# Patient Record
Sex: MALE | Race: WHITE | Employment: OTHER | URBAN - NONMETROPOLITAN AREA
[De-identification: names, ages, dates, MRNs, and addresses within clinical notes are randomized per-mention and may not be internally consistent; named-entity substitution may affect disease eponyms.]

---

## 2024-09-22 ENCOUNTER — APPOINTMENT (OUTPATIENT)
Dept: GENERAL RADIOLOGY | Age: 84
End: 2024-09-22
Payer: MEDICARE

## 2024-09-22 ENCOUNTER — HOSPITAL ENCOUNTER (INPATIENT)
Age: 84
LOS: 3 days | Discharge: NURSING, REHAB OR LONG-TERM CARE | End: 2024-09-25
Attending: EMERGENCY MEDICINE | Admitting: INTERNAL MEDICINE
Payer: MEDICARE

## 2024-09-22 DIAGNOSIS — S72.002A CLOSED FRACTURE OF LEFT HIP, INITIAL ENCOUNTER (HCC): Primary | ICD-10-CM

## 2024-09-22 PROBLEM — M25.552 LEFT HIP PAIN: Status: ACTIVE | Noted: 2024-09-22

## 2024-09-22 LAB
-: ABNORMAL
ALBUMIN SERPL-MCNC: 4 G/DL (ref 3.5–5.2)
ALP SERPL-CCNC: 50 U/L (ref 40–129)
ALT SERPL-CCNC: 8 U/L (ref 5–41)
ANION GAP SERPL CALCULATED.3IONS-SCNC: 10 MMOL/L (ref 9–17)
AST SERPL-CCNC: 14 U/L
BACTERIA URNS QL MICRO: ABNORMAL
BASOPHILS # BLD: 0.03 K/UL (ref 0–0.2)
BASOPHILS NFR BLD: 1 % (ref 0–2)
BILIRUB SERPL-MCNC: 0.3 MG/DL (ref 0.3–1.2)
BILIRUB UR QL STRIP: NEGATIVE
BUN SERPL-MCNC: 22 MG/DL (ref 8–23)
BUN/CREAT SERPL: 20 (ref 9–20)
CALCIUM SERPL-MCNC: 9.6 MG/DL (ref 8.6–10.4)
CHLORIDE SERPL-SCNC: 104 MMOL/L (ref 98–107)
CLARITY UR: ABNORMAL
CO2 SERPL-SCNC: 28 MMOL/L (ref 20–31)
COLOR UR: YELLOW
COMMENT: ABNORMAL
CREAT SERPL-MCNC: 1.1 MG/DL (ref 0.7–1.2)
EOSINOPHIL # BLD: 0.26 K/UL (ref 0–0.4)
EOSINOPHILS RELATIVE PERCENT: 4 % (ref 0–5)
EPI CELLS #/AREA URNS HPF: ABNORMAL /HPF
ERYTHROCYTE [DISTWIDTH] IN BLOOD BY AUTOMATED COUNT: 13.2 % (ref 12.1–15.2)
EST. AVERAGE GLUCOSE BLD GHB EST-MCNC: 126 MG/DL
GFR, ESTIMATED: 67 ML/MIN/1.73M2
GLUCOSE BLD-MCNC: 144 MG/DL (ref 65–99)
GLUCOSE BLD-MCNC: 152 MG/DL (ref 65–99)
GLUCOSE SERPL-MCNC: 150 MG/DL (ref 70–99)
GLUCOSE UR STRIP-MCNC: NEGATIVE MG/DL
HBA1C MFR BLD: 6 % (ref 4–6)
HCT VFR BLD AUTO: 36.1 % (ref 41–53)
HGB BLD-MCNC: 12 G/DL (ref 13.5–17.5)
HGB UR QL STRIP.AUTO: ABNORMAL
IMM GRANULOCYTES # BLD AUTO: 0.03 K/UL (ref 0–0.3)
IMM GRANULOCYTES NFR BLD: 1 % (ref 0–5)
INR PPP: 1
KETONES UR STRIP-MCNC: NEGATIVE MG/DL
LEUKOCYTE ESTERASE UR QL STRIP: ABNORMAL
LYMPHOCYTES NFR BLD: 0.99 K/UL (ref 1–4.8)
LYMPHOCYTES RELATIVE PERCENT: 15 % (ref 13–44)
MCH RBC QN AUTO: 30 PG (ref 26–34)
MCHC RBC AUTO-ENTMCNC: 33.2 G/DL (ref 31–37)
MCV RBC AUTO: 90.3 FL (ref 80–100)
MONOCYTES NFR BLD: 0.5 K/UL (ref 0–1)
MONOCYTES NFR BLD: 8 % (ref 5–9)
NEUTROPHILS NFR BLD: 71 % (ref 39–75)
NEUTS SEG NFR BLD: 4.61 K/UL (ref 2.1–6.5)
NITRITE UR QL STRIP: POSITIVE
PARTIAL THROMBOPLASTIN TIME: 28.7 SEC (ref 23.9–33.8)
PH UR STRIP: 8 [PH] (ref 5–8)
PLATELET # BLD AUTO: 232 K/UL (ref 140–450)
PMV BLD AUTO: 10.9 FL (ref 6–12)
POTASSIUM SERPL-SCNC: 4.5 MMOL/L (ref 3.7–5.3)
PROT SERPL-MCNC: 6.9 G/DL (ref 6.4–8.3)
PROT UR STRIP-MCNC: ABNORMAL MG/DL
PROTHROMBIN TIME: 13.5 SEC (ref 11.5–14.2)
RBC # BLD AUTO: 4 M/UL (ref 4.5–5.9)
RBC #/AREA URNS HPF: ABNORMAL /HPF (ref 0–2)
SODIUM SERPL-SCNC: 142 MMOL/L (ref 135–144)
SP GR UR STRIP: 1.01 (ref 1–1.03)
UROBILINOGEN UR STRIP-ACNC: NORMAL EU/DL (ref 0–1)
WBC #/AREA URNS HPF: ABNORMAL /HPF
WBC OTHER # BLD: 6.4 K/UL (ref 3.5–11)

## 2024-09-22 PROCEDURE — 6370000000 HC RX 637 (ALT 250 FOR IP): Performed by: INTERNAL MEDICINE

## 2024-09-22 PROCEDURE — 93005 ELECTROCARDIOGRAM TRACING: CPT | Performed by: EMERGENCY MEDICINE

## 2024-09-22 PROCEDURE — 83036 HEMOGLOBIN GLYCOSYLATED A1C: CPT

## 2024-09-22 PROCEDURE — 73502 X-RAY EXAM HIP UNI 2-3 VIEWS: CPT

## 2024-09-22 PROCEDURE — 51702 INSERT TEMP BLADDER CATH: CPT

## 2024-09-22 PROCEDURE — 85610 PROTHROMBIN TIME: CPT

## 2024-09-22 PROCEDURE — 85730 THROMBOPLASTIN TIME PARTIAL: CPT

## 2024-09-22 PROCEDURE — 1200000000 HC SEMI PRIVATE

## 2024-09-22 PROCEDURE — 85025 COMPLETE CBC W/AUTO DIFF WBC: CPT

## 2024-09-22 PROCEDURE — 99285 EMERGENCY DEPT VISIT HI MDM: CPT

## 2024-09-22 PROCEDURE — 94761 N-INVAS EAR/PLS OXIMETRY MLT: CPT

## 2024-09-22 PROCEDURE — 80053 COMPREHEN METABOLIC PANEL: CPT

## 2024-09-22 PROCEDURE — 87086 URINE CULTURE/COLONY COUNT: CPT

## 2024-09-22 PROCEDURE — 81001 URINALYSIS AUTO W/SCOPE: CPT

## 2024-09-22 PROCEDURE — 82947 ASSAY GLUCOSE BLOOD QUANT: CPT

## 2024-09-22 PROCEDURE — 71045 X-RAY EXAM CHEST 1 VIEW: CPT

## 2024-09-22 RX ORDER — SODIUM CHLORIDE 0.9 % (FLUSH) 0.9 %
5-40 SYRINGE (ML) INJECTION EVERY 12 HOURS SCHEDULED
Status: DISCONTINUED | OUTPATIENT
Start: 2024-09-22 | End: 2024-09-25 | Stop reason: HOSPADM

## 2024-09-22 RX ORDER — ACETAMINOPHEN 650 MG/1
650 SUPPOSITORY RECTAL EVERY 6 HOURS PRN
Status: DISCONTINUED | OUTPATIENT
Start: 2024-09-22 | End: 2024-09-25 | Stop reason: HOSPADM

## 2024-09-22 RX ORDER — SODIUM CHLORIDE 9 MG/ML
INJECTION, SOLUTION INTRAVENOUS PRN
Status: DISCONTINUED | OUTPATIENT
Start: 2024-09-22 | End: 2024-09-25 | Stop reason: HOSPADM

## 2024-09-22 RX ORDER — CYCLOBENZAPRINE HCL 10 MG
5 TABLET ORAL EVERY 6 HOURS PRN
Status: DISCONTINUED | OUTPATIENT
Start: 2024-09-22 | End: 2024-09-25 | Stop reason: HOSPADM

## 2024-09-22 RX ORDER — POTASSIUM CHLORIDE 7.45 MG/ML
10 INJECTION INTRAVENOUS PRN
Status: DISCONTINUED | OUTPATIENT
Start: 2024-09-22 | End: 2024-09-25 | Stop reason: HOSPADM

## 2024-09-22 RX ORDER — FERROUS SULFATE 325(65) MG
325 TABLET ORAL
Status: DISCONTINUED | OUTPATIENT
Start: 2024-09-23 | End: 2024-09-25 | Stop reason: HOSPADM

## 2024-09-22 RX ORDER — LISINOPRIL 2.5 MG/1
2.5 TABLET ORAL DAILY
Status: ON HOLD | COMMUNITY
Start: 2024-09-06

## 2024-09-22 RX ORDER — INSULIN LISPRO 100 [IU]/ML
0-4 INJECTION, SOLUTION INTRAVENOUS; SUBCUTANEOUS
Status: DISCONTINUED | OUTPATIENT
Start: 2024-09-22 | End: 2024-09-25 | Stop reason: HOSPADM

## 2024-09-22 RX ORDER — FERROUS SULFATE 325(65) MG
325 TABLET ORAL
Status: ON HOLD | COMMUNITY
Start: 2023-09-05

## 2024-09-22 RX ORDER — FAMOTIDINE 20 MG/1
20 TABLET, FILM COATED ORAL 2 TIMES DAILY
Status: DISCONTINUED | OUTPATIENT
Start: 2024-09-22 | End: 2024-09-25 | Stop reason: HOSPADM

## 2024-09-22 RX ORDER — LANOLIN ALCOHOL/MO/W.PET/CERES
500 CREAM (GRAM) TOPICAL DAILY
Status: DISCONTINUED | OUTPATIENT
Start: 2024-09-22 | End: 2024-09-25 | Stop reason: HOSPADM

## 2024-09-22 RX ORDER — SODIUM CHLORIDE 450 MG/100ML
INJECTION, SOLUTION INTRAVENOUS CONTINUOUS
Status: DISCONTINUED | OUTPATIENT
Start: 2024-09-23 | End: 2024-09-23

## 2024-09-22 RX ORDER — SODIUM CHLORIDE 0.9 % (FLUSH) 0.9 %
5-40 SYRINGE (ML) INJECTION PRN
Status: DISCONTINUED | OUTPATIENT
Start: 2024-09-22 | End: 2024-09-24

## 2024-09-22 RX ORDER — TRAMADOL HYDROCHLORIDE 50 MG/1
50 TABLET ORAL EVERY 6 HOURS PRN
Status: DISCONTINUED | OUTPATIENT
Start: 2024-09-22 | End: 2024-09-25 | Stop reason: HOSPADM

## 2024-09-22 RX ORDER — DEXTROSE MONOHYDRATE 100 MG/ML
INJECTION, SOLUTION INTRAVENOUS CONTINUOUS PRN
Status: DISCONTINUED | OUTPATIENT
Start: 2024-09-22 | End: 2024-09-25 | Stop reason: HOSPADM

## 2024-09-22 RX ORDER — CEFAZOLIN SODIUM 2 G/50ML
2000 SOLUTION INTRAVENOUS
Status: COMPLETED | OUTPATIENT
Start: 2024-09-23 | End: 2024-09-23

## 2024-09-22 RX ORDER — UREA 10 %
500 LOTION (ML) TOPICAL DAILY
Status: ON HOLD | COMMUNITY

## 2024-09-22 RX ORDER — CELECOXIB 200 MG/1
200 CAPSULE ORAL DAILY
Status: ON HOLD | COMMUNITY
Start: 2019-12-31

## 2024-09-22 RX ORDER — GLUCAGON 1 MG/ML
1 KIT INJECTION PRN
Status: DISCONTINUED | OUTPATIENT
Start: 2024-09-22 | End: 2024-09-25 | Stop reason: HOSPADM

## 2024-09-22 RX ORDER — OXYCODONE AND ACETAMINOPHEN 5; 325 MG/1; MG/1
1 TABLET ORAL EVERY 6 HOURS PRN
Status: DISCONTINUED | OUTPATIENT
Start: 2024-09-22 | End: 2024-09-25 | Stop reason: HOSPADM

## 2024-09-22 RX ORDER — ASPIRIN 81 MG/1
81 TABLET, CHEWABLE ORAL
Status: ON HOLD | COMMUNITY
Start: 2018-05-14

## 2024-09-22 RX ORDER — SITAGLIPTIN AND METFORMIN HYDROCHLORIDE 1000; 50 MG/1; MG/1
1 TABLET, FILM COATED ORAL DAILY
Status: ON HOLD | COMMUNITY
Start: 2019-12-28

## 2024-09-22 RX ORDER — ONDANSETRON 2 MG/ML
4 INJECTION INTRAMUSCULAR; INTRAVENOUS EVERY 6 HOURS PRN
Status: DISCONTINUED | OUTPATIENT
Start: 2024-09-22 | End: 2024-09-25 | Stop reason: HOSPADM

## 2024-09-22 RX ORDER — ALOGLIPTIN 12.5 MG/1
12.5 TABLET, FILM COATED ORAL DAILY
Status: DISCONTINUED | OUTPATIENT
Start: 2024-09-23 | End: 2024-09-25 | Stop reason: HOSPADM

## 2024-09-22 RX ORDER — POLYETHYLENE GLYCOL 3350 17 G/17G
17 POWDER, FOR SOLUTION ORAL DAILY PRN
Status: DISCONTINUED | OUTPATIENT
Start: 2024-09-22 | End: 2024-09-25 | Stop reason: HOSPADM

## 2024-09-22 RX ORDER — ONDANSETRON 4 MG/1
4 TABLET, ORALLY DISINTEGRATING ORAL EVERY 8 HOURS PRN
Status: DISCONTINUED | OUTPATIENT
Start: 2024-09-22 | End: 2024-09-25 | Stop reason: HOSPADM

## 2024-09-22 RX ORDER — LISINOPRIL 5 MG/1
2.5 TABLET ORAL DAILY
Status: DISCONTINUED | OUTPATIENT
Start: 2024-09-22 | End: 2024-09-25 | Stop reason: HOSPADM

## 2024-09-22 RX ORDER — ACETAMINOPHEN 325 MG/1
650 TABLET ORAL EVERY 6 HOURS PRN
Status: DISCONTINUED | OUTPATIENT
Start: 2024-09-22 | End: 2024-09-25 | Stop reason: HOSPADM

## 2024-09-22 RX ORDER — ASCORBIC ACID 500 MG
500 TABLET ORAL DAILY
Status: DISCONTINUED | OUTPATIENT
Start: 2024-09-22 | End: 2024-09-25 | Stop reason: HOSPADM

## 2024-09-22 RX ORDER — ASPIRIN 81 MG/1
81 TABLET, CHEWABLE ORAL
Status: DISCONTINUED | OUTPATIENT
Start: 2024-09-22 | End: 2024-09-25 | Stop reason: HOSPADM

## 2024-09-22 RX ORDER — POTASSIUM CHLORIDE 750 MG/1
40 TABLET, EXTENDED RELEASE ORAL PRN
Status: DISCONTINUED | OUTPATIENT
Start: 2024-09-22 | End: 2024-09-25 | Stop reason: HOSPADM

## 2024-09-22 RX ORDER — MULTIVIT-MIN/IRON/FOLIC ACID/K 18-600-40
1 CAPSULE ORAL DAILY
Status: ON HOLD | COMMUNITY

## 2024-09-22 RX ORDER — INSULIN LISPRO 100 [IU]/ML
0-4 INJECTION, SOLUTION INTRAVENOUS; SUBCUTANEOUS NIGHTLY
Status: DISCONTINUED | OUTPATIENT
Start: 2024-09-22 | End: 2024-09-25 | Stop reason: HOSPADM

## 2024-09-22 RX ORDER — MAGNESIUM SULFATE IN WATER 40 MG/ML
2000 INJECTION, SOLUTION INTRAVENOUS PRN
Status: DISCONTINUED | OUTPATIENT
Start: 2024-09-22 | End: 2024-09-25 | Stop reason: HOSPADM

## 2024-09-22 RX ORDER — METFORMIN HCL 500 MG
1000 TABLET, EXTENDED RELEASE 24 HR ORAL
Status: DISCONTINUED | OUTPATIENT
Start: 2024-09-23 | End: 2024-09-25 | Stop reason: HOSPADM

## 2024-09-22 RX ADMIN — TRAMADOL HYDROCHLORIDE 50 MG: 50 TABLET ORAL at 17:27

## 2024-09-22 RX ADMIN — FAMOTIDINE 20 MG: 20 TABLET, FILM COATED ORAL at 22:33

## 2024-09-22 RX ADMIN — Medication 500 MCG: at 17:27

## 2024-09-22 RX ADMIN — OXYCODONE HYDROCHLORIDE AND ACETAMINOPHEN 1 TABLET: 5; 325 TABLET ORAL at 22:33

## 2024-09-22 ASSESSMENT — PAIN SCALES - GENERAL
PAINLEVEL_OUTOF10: 0
PAINLEVEL_OUTOF10: 7
PAINLEVEL_OUTOF10: 9
PAINLEVEL_OUTOF10: 0

## 2024-09-22 ASSESSMENT — PAIN DESCRIPTION - DESCRIPTORS
DESCRIPTORS: ACHING
DESCRIPTORS: SHOOTING;SQUEEZING;STABBING

## 2024-09-22 ASSESSMENT — PAIN - FUNCTIONAL ASSESSMENT
PAIN_FUNCTIONAL_ASSESSMENT: PREVENTS OR INTERFERES WITH ALL ACTIVE AND SOME PASSIVE ACTIVITIES
PAIN_FUNCTIONAL_ASSESSMENT: ACTIVITIES ARE NOT PREVENTED
PAIN_FUNCTIONAL_ASSESSMENT: NONE - DENIES PAIN

## 2024-09-22 ASSESSMENT — PAIN DESCRIPTION - LOCATION
LOCATION: HIP
LOCATION: LEG;HIP

## 2024-09-22 ASSESSMENT — PAIN DESCRIPTION - ORIENTATION: ORIENTATION: LEFT

## 2024-09-22 ASSESSMENT — PAIN DESCRIPTION - FREQUENCY: FREQUENCY: INTERMITTENT

## 2024-09-22 ASSESSMENT — PAIN DESCRIPTION - ONSET: ONSET: SUDDEN

## 2024-09-22 ASSESSMENT — PAIN DESCRIPTION - PAIN TYPE: TYPE: ACUTE PAIN

## 2024-09-22 NOTE — PROGRESS NOTES
Chronic nuno left in place per Dr. Ji. 400 mL yehuda, malodorous urine emptied from leg bag. Leg bag removed, and new standard bag placed.

## 2024-09-22 NOTE — FLOWSHEET NOTE
09/22/24 1739   Treatment Team Notification   Reason for Communication Review case   Name of Team Member Notified Dr. Whipple   Treatment Team Role Consulting Provider   Method of Communication Call   Response See orders     Dr. Whipple notified of ortho consult. Also, made aware of required pre-op cardiac clearance. Order given for patient to be NPO after 0900 tomorrow 9/23/24, then start NS 0.45% at 75 mL/hr, Ancef OC to OR.   [Initial Evaluation] : an initial evaluation [DM Type 2] : DM Type 2 [Hypothyroidism] : hypothyroidism

## 2024-09-22 NOTE — FLOWSHEET NOTE
09/22/24 1714   Treatment Team Notification   Reason for Communication Review case   Name of Team Member Notified Dr. Sampson   Treatment Team Role Consulting Provider   Method of Communication Call   Response No new orders     Dr. Sampson notified of cardiology consult.

## 2024-09-22 NOTE — PROGRESS NOTES
Patient arrives to unit via cart. Hover mat transfer. Report received from  Jacob RIVERA. KRANTHI GUSMAN at this time. Oriented to room, call light system, bed rails, phone, lights, dry erase board and bathroom. Vital signs obtained. Nursing assessment complete. Telemetry monitor applied, patient aware of placement and reason. Orders reviewed with patient. Patient instructed about the schedule of the day including: vital sign frequency, lab draws, possible tests, frequency of MD and staff rounds, daily weights, I &O's and prescribed diet. Patient is alert and oriented to time, place, person and situation. Admission questions answered with Patient as source. Patient is a high fall risk, discussed such with patient, as well as, fall prevention strategies. Family at bedside. Ice water provided. All care needs addressed with no further needs at this time. Patient is currently resting in bed with brakes locked, in lowest position, side rails up 2/4, call light and bedside table within reach.     VITALS:  BP (!) 146/75   Pulse 75   Temp 97.5 °F (36.4 °C) (Oral)   Resp 18   Ht 1.854 m (6' 1\")   Wt 84.8 kg (187 lb)   SpO2 97%   BMI 24.67 kg/m²     Implemented/recommended use of non-skid footwear, Implemented/recommended use of fall risk identification flag to all team members, Implemented/recommended resources for alarm system (personal alarm, bed alarm, call bell, etc.) , Implemented/recommended environmental changes (remove hazards, lower bed, improve lighting, etc.), and Implemented/recommended increased supervision/assistance    Patient reports he is \"still deciding on what he's doing, and where he's doing it at.\" This nurse asks for clarification, patient reports he utilizes [facility] for his medical services and he is still deciding if he wishes to have surgery at this facility or to be transferred. States, \"I want to see if this doctor impresses me or not. Then I'll make my decision.\"     Patient has chronic nuno

## 2024-09-22 NOTE — FLOWSHEET NOTE
09/22/24 1705   Treatment Team Notification   Reason for Communication Review case   Name of Team Member Notified Dr. Ji   Treatment Team Role Attending Provider   Method of Communication Call   Response See orders     Dr. Ji notified of telemetry rhythm Mobitz II w PVCs and BBB. Order given to consult cardiology. Leave chronic nuno catheter in place for now, do not replace at this time per Dr. Ji.

## 2024-09-22 NOTE — ED PROVIDER NOTES
level 5)     ED Triage Vitals [09/22/24 1359]   BP Systolic BP Percentile Diastolic BP Percentile Temp Temp Source Pulse Respirations SpO2   131/65 -- -- 97.4 °F (36.3 °C) Oral 70 18 95 %      Height Weight - Scale         1.854 m (6' 1\") 84.8 kg (187 lb)             Physical Exam    Physical    Vital signs and nursing notes were reviewed as well as the social, family, and past medical history.    Gen. appearance: Patient is alert and oriented and in no acute distress    Head: Atraumatic, normocephalic    Neck: Supple, trachea/thyroid normal, no C-spine tenderness on exam    EENT: PERRLA, EOMI, conjunctiva normal.    Skin: Warm and dry with no rash    Cardiovascular: Heart RRR, no gallops or rubs, no aortic enlargement or bruits noted.    Respiratory: Lungs clear, no wheezing, no rales, normal breath sounds.    Gastrointestinal: Abdomen nontender, bowel sounds normal, no rebound/guarding/distention or mass    Musculoskeletal: Positive tenderness with palpation and movement in the left pelvis and hip.  There is no tenderness at the knee or ankle.  Right lower extremity is unremarkable.  Upper extremities show abrasions but no bony tenderness and no pain with range of motion.  No tenderness to the back    Neurological: Patient is alert and oriented ×3, no focal motor or sensory deficits noted      DIAGNOSTIC RESULTS              LABS:  Labs Reviewed   CBC WITH AUTO DIFFERENTIAL - Abnormal; Notable for the following components:       Result Value    RBC 4.00 (*)     Hemoglobin 12.0 (*)     Hematocrit 36.1 (*)     Lymphocytes Absolute 0.99 (*)     All other components within normal limits   COMPREHENSIVE METABOLIC PANEL - Abnormal; Notable for the following components:    Glucose 150 (*)     All other components within normal limits   PROTIME-INR   APTT       All other labs were within normal range or not returned as of this dictation.    EMERGENCY DEPARTMENT COURSE and DIFFERENTIAL DIAGNOSIS/MDM:   Vitals:    Vitals:     09/22/24 1359   BP: 131/65   Pulse: 70   Resp: 18   Temp: 97.4 °F (36.3 °C)   TempSrc: Oral   SpO2: 95%   Weight: 84.8 kg (187 lb)   Height: 1.854 m (6' 1\")                 REASSESSMENT      Here in the ED interpretation of the x-ray his the patient has a left femoral neck fracture.  Official radiology interpretation still pending at this time.  Patient's labs were reviewed and patient will be admitted for surgery.  I discussed with the orthopedic surgeon and the hospitalist and we will admit    PROCEDURES:  Unless otherwise noted below, none     Procedures    FINAL IMPRESSION      1. Closed fracture of left hip, initial encounter ([facility])          DISPOSITION/PLAN   DISPOSITION Decision To Admit 09/22/2024 03:06:43 PM  Condition at Disposition: Stable      PATIENT REFERRED TO:  No follow-up provider specified.    DISCHARGE MEDICATIONS:  New Prescriptions    No medications on file          (Please note that portions ofthis note were completed with a voice recognition program.  Efforts were made to edit the dictations but occasionally words are mis-transcribed.)    John Roach MD(electronically signed)  Attending Emergency Physician            John Roach MD  09/22/24 4995

## 2024-09-23 ENCOUNTER — ANESTHESIA EVENT (OUTPATIENT)
Dept: OPERATING ROOM | Age: 84
End: 2024-09-23
Payer: MEDICARE

## 2024-09-23 ENCOUNTER — ANESTHESIA (OUTPATIENT)
Dept: OPERATING ROOM | Age: 84
End: 2024-09-23
Payer: MEDICARE

## 2024-09-23 ENCOUNTER — APPOINTMENT (OUTPATIENT)
Dept: GENERAL RADIOLOGY | Age: 84
End: 2024-09-23
Payer: MEDICARE

## 2024-09-23 LAB
-: ABNORMAL
ABO + RH BLD: NORMAL
ANION GAP SERPL CALCULATED.3IONS-SCNC: 6 MMOL/L (ref 9–17)
ARM BAND NUMBER: NORMAL
BACTERIA URNS QL MICRO: ABNORMAL
BASOPHILS # BLD: 0.02 K/UL (ref 0–0.2)
BASOPHILS NFR BLD: 0 % (ref 0–2)
BILIRUB UR QL STRIP: NEGATIVE
BLOOD BANK SAMPLE EXPIRATION: NORMAL
BLOOD GROUP ANTIBODIES SERPL: NEGATIVE
BUN SERPL-MCNC: 18 MG/DL (ref 8–23)
BUN/CREAT SERPL: 18 (ref 9–20)
CALCIUM SERPL-MCNC: 9.1 MG/DL (ref 8.6–10.4)
CHLORIDE SERPL-SCNC: 105 MMOL/L (ref 98–107)
CLARITY UR: ABNORMAL
CO2 SERPL-SCNC: 29 MMOL/L (ref 20–31)
COLOR UR: YELLOW
COMMENT: ABNORMAL
CREAT SERPL-MCNC: 1 MG/DL (ref 0.7–1.2)
EKG ATRIAL RATE: 68 BPM
EKG Q-T INTERVAL: 454 MS
EKG QRS DURATION: 120 MS
EKG QTC CALCULATION (BAZETT): 438 MS
EKG R AXIS: -62 DEGREES
EKG T AXIS: 47 DEGREES
EKG VENTRICULAR RATE: 56 BPM
EOSINOPHIL # BLD: 0.25 K/UL (ref 0–0.4)
EOSINOPHILS RELATIVE PERCENT: 3 % (ref 0–5)
EPI CELLS #/AREA URNS HPF: ABNORMAL /HPF
ERYTHROCYTE [DISTWIDTH] IN BLOOD BY AUTOMATED COUNT: 13.2 % (ref 12.1–15.2)
GFR, ESTIMATED: 75 ML/MIN/1.73M2
GLUCOSE BLD-MCNC: 113 MG/DL (ref 65–99)
GLUCOSE BLD-MCNC: 124 MG/DL (ref 65–99)
GLUCOSE BLD-MCNC: 80 MG/DL (ref 65–99)
GLUCOSE BLD-MCNC: 85 MG/DL (ref 65–99)
GLUCOSE SERPL-MCNC: 129 MG/DL (ref 70–99)
GLUCOSE UR STRIP-MCNC: NEGATIVE MG/DL
HCT VFR BLD AUTO: 35 % (ref 41–53)
HGB BLD-MCNC: 11.6 G/DL (ref 13.5–17.5)
HGB UR QL STRIP.AUTO: ABNORMAL
IMM GRANULOCYTES # BLD AUTO: 0.02 K/UL (ref 0–0.3)
IMM GRANULOCYTES NFR BLD: 0 % (ref 0–5)
KETONES UR STRIP-MCNC: NEGATIVE MG/DL
LEUKOCYTE ESTERASE UR QL STRIP: ABNORMAL
LYMPHOCYTES NFR BLD: 0.86 K/UL (ref 1–4.8)
LYMPHOCYTES RELATIVE PERCENT: 9 % (ref 13–44)
MCH RBC QN AUTO: 29.9 PG (ref 26–34)
MCHC RBC AUTO-ENTMCNC: 33.1 G/DL (ref 31–37)
MCV RBC AUTO: 90.2 FL (ref 80–100)
MONOCYTES NFR BLD: 0.72 K/UL (ref 0–1)
MONOCYTES NFR BLD: 8 % (ref 5–9)
NEUTROPHILS NFR BLD: 80 % (ref 39–75)
NEUTS SEG NFR BLD: 7.28 K/UL (ref 2.1–6.5)
NITRITE UR QL STRIP: POSITIVE
PH UR STRIP: 8 [PH] (ref 5–8)
PLATELET # BLD AUTO: 194 K/UL (ref 140–450)
PMV BLD AUTO: 10.7 FL (ref 6–12)
POTASSIUM SERPL-SCNC: 5 MMOL/L (ref 3.7–5.3)
PROT UR STRIP-MCNC: ABNORMAL MG/DL
RBC # BLD AUTO: 3.88 M/UL (ref 4.5–5.9)
RBC #/AREA URNS HPF: ABNORMAL /HPF (ref 0–2)
SODIUM SERPL-SCNC: 140 MMOL/L (ref 135–144)
SP GR UR STRIP: 1.01 (ref 1–1.03)
UROBILINOGEN UR STRIP-ACNC: NORMAL EU/DL (ref 0–1)
WBC #/AREA URNS HPF: ABNORMAL /HPF
WBC OTHER # BLD: 9.2 K/UL (ref 3.5–11)

## 2024-09-23 PROCEDURE — 87086 URINE CULTURE/COLONY COUNT: CPT

## 2024-09-23 PROCEDURE — 2580000003 HC RX 258: Performed by: ORTHOPAEDIC SURGERY

## 2024-09-23 PROCEDURE — 36415 COLL VENOUS BLD VENIPUNCTURE: CPT

## 2024-09-23 PROCEDURE — 2580000003 HC RX 258: Performed by: NURSE ANESTHETIST, CERTIFIED REGISTERED

## 2024-09-23 PROCEDURE — 3600000005 HC SURGERY LEVEL 5 BASE: Performed by: ORTHOPAEDIC SURGERY

## 2024-09-23 PROCEDURE — 82947 ASSAY GLUCOSE BLOOD QUANT: CPT

## 2024-09-23 PROCEDURE — 93010 ELECTROCARDIOGRAM REPORT: CPT | Performed by: INTERNAL MEDICINE

## 2024-09-23 PROCEDURE — 80048 BASIC METABOLIC PNL TOTAL CA: CPT

## 2024-09-23 PROCEDURE — 99223 1ST HOSP IP/OBS HIGH 75: CPT | Performed by: INTERNAL MEDICINE

## 2024-09-23 PROCEDURE — 2500000003 HC RX 250 WO HCPCS: Performed by: NURSE ANESTHETIST, CERTIFIED REGISTERED

## 2024-09-23 PROCEDURE — 1200000000 HC SEMI PRIVATE

## 2024-09-23 PROCEDURE — 2709999900 HC NON-CHARGEABLE SUPPLY: Performed by: ORTHOPAEDIC SURGERY

## 2024-09-23 PROCEDURE — 3700000001 HC ADD 15 MINUTES (ANESTHESIA): Performed by: ORTHOPAEDIC SURGERY

## 2024-09-23 PROCEDURE — C1776 JOINT DEVICE (IMPLANTABLE): HCPCS | Performed by: ORTHOPAEDIC SURGERY

## 2024-09-23 PROCEDURE — 3600000015 HC SURGERY LEVEL 5 ADDTL 15MIN: Performed by: ORTHOPAEDIC SURGERY

## 2024-09-23 PROCEDURE — 2580000003 HC RX 258: Performed by: INTERNAL MEDICINE

## 2024-09-23 PROCEDURE — 73502 X-RAY EXAM HIP UNI 2-3 VIEWS: CPT

## 2024-09-23 PROCEDURE — 6360000002 HC RX W HCPCS: Performed by: INTERNAL MEDICINE

## 2024-09-23 PROCEDURE — 85025 COMPLETE CBC W/AUTO DIFF WBC: CPT

## 2024-09-23 PROCEDURE — 6360000002 HC RX W HCPCS: Performed by: ORTHOPAEDIC SURGERY

## 2024-09-23 PROCEDURE — A4217 STERILE WATER/SALINE, 500 ML: HCPCS | Performed by: ORTHOPAEDIC SURGERY

## 2024-09-23 PROCEDURE — 2720000010 HC SURG SUPPLY STERILE: Performed by: ORTHOPAEDIC SURGERY

## 2024-09-23 PROCEDURE — 3700000000 HC ANESTHESIA ATTENDED CARE: Performed by: ORTHOPAEDIC SURGERY

## 2024-09-23 PROCEDURE — 0SRS0J9 REPLACEMENT OF LEFT HIP JOINT, FEMORAL SURFACE WITH SYNTHETIC SUBSTITUTE, CEMENTED, OPEN APPROACH: ICD-10-PCS | Performed by: ORTHOPAEDIC SURGERY

## 2024-09-23 PROCEDURE — 94761 N-INVAS EAR/PLS OXIMETRY MLT: CPT

## 2024-09-23 PROCEDURE — 81001 URINALYSIS AUTO W/SCOPE: CPT

## 2024-09-23 PROCEDURE — 6370000000 HC RX 637 (ALT 250 FOR IP): Performed by: INTERNAL MEDICINE

## 2024-09-23 PROCEDURE — 7100000001 HC PACU RECOVERY - ADDTL 15 MIN: Performed by: ORTHOPAEDIC SURGERY

## 2024-09-23 PROCEDURE — 6360000002 HC RX W HCPCS: Performed by: NURSE ANESTHETIST, CERTIFIED REGISTERED

## 2024-09-23 PROCEDURE — C1713 ANCHOR/SCREW BN/BN,TIS/BN: HCPCS | Performed by: ORTHOPAEDIC SURGERY

## 2024-09-23 PROCEDURE — 7100000000 HC PACU RECOVERY - FIRST 15 MIN: Performed by: ORTHOPAEDIC SURGERY

## 2024-09-23 PROCEDURE — 86900 BLOOD TYPING SEROLOGIC ABO: CPT

## 2024-09-23 PROCEDURE — 86850 RBC ANTIBODY SCREEN: CPT

## 2024-09-23 PROCEDURE — 51702 INSERT TEMP BLADDER CATH: CPT

## 2024-09-23 PROCEDURE — 86901 BLOOD TYPING SEROLOGIC RH(D): CPT

## 2024-09-23 DEVICE — CEMENT BNE 40GM HI VISC RADPQ FOR REV SURG: Type: IMPLANTABLE DEVICE | Site: HIP | Status: FUNCTIONAL

## 2024-09-23 DEVICE — CEMENT BNE 40GM W/ GENT HI VISC RADPQ FOR REV SURG: Type: IMPLANTABLE DEVICE | Status: FUNCTIONAL

## 2024-09-23 RX ORDER — SODIUM CHLORIDE 0.9 % (FLUSH) 0.9 %
5-40 SYRINGE (ML) INJECTION EVERY 12 HOURS SCHEDULED
Status: DISCONTINUED | OUTPATIENT
Start: 2024-09-23 | End: 2024-09-25 | Stop reason: HOSPADM

## 2024-09-23 RX ORDER — BUPIVACAINE HYDROCHLORIDE 7.5 MG/ML
INJECTION, SOLUTION EPIDURAL; RETROBULBAR
Status: COMPLETED | OUTPATIENT
Start: 2024-09-23 | End: 2024-09-23

## 2024-09-23 RX ORDER — SODIUM CHLORIDE, SODIUM LACTATE, POTASSIUM CHLORIDE, CALCIUM CHLORIDE 600; 310; 30; 20 MG/100ML; MG/100ML; MG/100ML; MG/100ML
INJECTION, SOLUTION INTRAVENOUS
Status: DISCONTINUED | OUTPATIENT
Start: 2024-09-23 | End: 2024-09-23 | Stop reason: SDUPTHER

## 2024-09-23 RX ORDER — SODIUM CHLORIDE 450 MG/100ML
INJECTION, SOLUTION INTRAVENOUS CONTINUOUS
Status: DISCONTINUED | OUTPATIENT
Start: 2024-09-23 | End: 2024-09-24

## 2024-09-23 RX ORDER — PROPOFOL 10 MG/ML
INJECTION, EMULSION INTRAVENOUS
Status: DISCONTINUED | OUTPATIENT
Start: 2024-09-23 | End: 2024-09-23 | Stop reason: SDUPTHER

## 2024-09-23 RX ORDER — LIDOCAINE HYDROCHLORIDE 10 MG/ML
INJECTION, SOLUTION EPIDURAL; INFILTRATION; INTRACAUDAL; PERINEURAL
Status: DISCONTINUED | OUTPATIENT
Start: 2024-09-23 | End: 2024-09-23 | Stop reason: SDUPTHER

## 2024-09-23 RX ORDER — SODIUM CHLORIDE 0.9 % (FLUSH) 0.9 %
5-40 SYRINGE (ML) INJECTION PRN
Status: DISCONTINUED | OUTPATIENT
Start: 2024-09-23 | End: 2024-09-25 | Stop reason: HOSPADM

## 2024-09-23 RX ORDER — SODIUM CHLORIDE 9 MG/ML
INJECTION, SOLUTION INTRAVENOUS PRN
Status: DISCONTINUED | OUTPATIENT
Start: 2024-09-23 | End: 2024-09-25 | Stop reason: HOSPADM

## 2024-09-23 RX ORDER — FENTANYL CITRATE 50 UG/ML
INJECTION, SOLUTION INTRAMUSCULAR; INTRAVENOUS
Status: DISCONTINUED | OUTPATIENT
Start: 2024-09-23 | End: 2024-09-23 | Stop reason: SDUPTHER

## 2024-09-23 RX ORDER — PHENYLEPHRINE HYDROCHLORIDE 10 MG/ML
INJECTION INTRAVENOUS
Status: DISCONTINUED | OUTPATIENT
Start: 2024-09-23 | End: 2024-09-23 | Stop reason: SDUPTHER

## 2024-09-23 RX ORDER — MIDAZOLAM HYDROCHLORIDE 1 MG/ML
INJECTION INTRAMUSCULAR; INTRAVENOUS
Status: DISCONTINUED | OUTPATIENT
Start: 2024-09-23 | End: 2024-09-23 | Stop reason: SDUPTHER

## 2024-09-23 RX ORDER — ENOXAPARIN SODIUM 100 MG/ML
40 INJECTION SUBCUTANEOUS DAILY
Status: DISCONTINUED | OUTPATIENT
Start: 2024-09-24 | End: 2024-09-25 | Stop reason: HOSPADM

## 2024-09-23 RX ORDER — KETAMINE HCL 50MG/ML(1)
SYRINGE (ML) INTRAVENOUS
Status: DISCONTINUED | OUTPATIENT
Start: 2024-09-23 | End: 2024-09-23 | Stop reason: SDUPTHER

## 2024-09-23 RX ADMIN — PHENYLEPHRINE HYDROCHLORIDE 200 MCG: 10 INJECTION INTRAVENOUS at 20:11

## 2024-09-23 RX ADMIN — METFORMIN HYDROCHLORIDE 1000 MG: 500 TABLET, EXTENDED RELEASE ORAL at 08:49

## 2024-09-23 RX ADMIN — SODIUM CHLORIDE: 4.5 INJECTION, SOLUTION INTRAVENOUS at 04:41

## 2024-09-23 RX ADMIN — OXYCODONE HYDROCHLORIDE AND ACETAMINOPHEN 1 TABLET: 5; 325 TABLET ORAL at 08:44

## 2024-09-23 RX ADMIN — HYDROMORPHONE HYDROCHLORIDE 0.5 MG: 1 INJECTION, SOLUTION INTRAMUSCULAR; INTRAVENOUS; SUBCUTANEOUS at 11:04

## 2024-09-23 RX ADMIN — MIDAZOLAM 2 MG: 1 INJECTION INTRAMUSCULAR; INTRAVENOUS at 19:36

## 2024-09-23 RX ADMIN — SODIUM CHLORIDE, PRESERVATIVE FREE 10 ML: 5 INJECTION INTRAVENOUS at 11:07

## 2024-09-23 RX ADMIN — PHENYLEPHRINE HYDROCHLORIDE 200 MCG: 10 INJECTION INTRAVENOUS at 20:06

## 2024-09-23 RX ADMIN — OXYCODONE HYDROCHLORIDE AND ACETAMINOPHEN 500 MG: 500 TABLET ORAL at 08:44

## 2024-09-23 RX ADMIN — Medication 25 MG: at 19:36

## 2024-09-23 RX ADMIN — PHENYLEPHRINE HYDROCHLORIDE 200 MCG: 10 INJECTION INTRAVENOUS at 20:28

## 2024-09-23 RX ADMIN — PROPOFOL 50 MG: 10 INJECTION, EMULSION INTRAVENOUS at 20:00

## 2024-09-23 RX ADMIN — SODIUM CHLORIDE, PRESERVATIVE FREE 10 ML: 5 INJECTION INTRAVENOUS at 23:07

## 2024-09-23 RX ADMIN — FAMOTIDINE 20 MG: 20 TABLET, FILM COATED ORAL at 08:46

## 2024-09-23 RX ADMIN — FENTANYL CITRATE 50 MCG: 50 INJECTION, SOLUTION INTRAMUSCULAR; INTRAVENOUS at 19:36

## 2024-09-23 RX ADMIN — BUPIVACAINE HYDROCHLORIDE 11.25 MG: 7.5 INJECTION, SOLUTION EPIDURAL; RETROBULBAR at 19:57

## 2024-09-23 RX ADMIN — PROPOFOL 50 MCG/KG/MIN: 10 INJECTION, EMULSION INTRAVENOUS at 20:00

## 2024-09-23 RX ADMIN — SODIUM CHLORIDE: 4.5 INJECTION, SOLUTION INTRAVENOUS at 23:09

## 2024-09-23 RX ADMIN — Medication 500 MCG: at 08:44

## 2024-09-23 RX ADMIN — LISINOPRIL 2.5 MG: 5 TABLET ORAL at 08:44

## 2024-09-23 RX ADMIN — PHENYLEPHRINE HYDROCHLORIDE 200 MCG: 10 INJECTION INTRAVENOUS at 21:05

## 2024-09-23 RX ADMIN — CYCLOBENZAPRINE 5 MG: 10 TABLET, FILM COATED ORAL at 08:45

## 2024-09-23 RX ADMIN — SODIUM CHLORIDE, POTASSIUM CHLORIDE, SODIUM LACTATE AND CALCIUM CHLORIDE: 600; 310; 30; 20 INJECTION, SOLUTION INTRAVENOUS at 21:12

## 2024-09-23 RX ADMIN — FERROUS SULFATE TAB 325 MG (65 MG ELEMENTAL FE) 325 MG: 325 (65 FE) TAB at 08:45

## 2024-09-23 RX ADMIN — PHENYLEPHRINE HYDROCHLORIDE 200 MCG: 10 INJECTION INTRAVENOUS at 20:20

## 2024-09-23 RX ADMIN — CEFTRIAXONE SODIUM 1000 MG: 1 INJECTION, POWDER, FOR SOLUTION INTRAMUSCULAR; INTRAVENOUS at 08:57

## 2024-09-23 RX ADMIN — HYDROMORPHONE HYDROCHLORIDE 0.5 MG: 1 INJECTION, SOLUTION INTRAMUSCULAR; INTRAVENOUS; SUBCUTANEOUS at 16:59

## 2024-09-23 RX ADMIN — ALOGLIPTIN 12.5 MG: 12.5 TABLET, FILM COATED ORAL at 08:44

## 2024-09-23 RX ADMIN — LIDOCAINE HYDROCHLORIDE 50 MG: 10 INJECTION, SOLUTION EPIDURAL; INFILTRATION; INTRACAUDAL; PERINEURAL at 20:00

## 2024-09-23 RX ADMIN — PHENYLEPHRINE HYDROCHLORIDE 200 MCG: 10 INJECTION INTRAVENOUS at 20:47

## 2024-09-23 RX ADMIN — PHENYLEPHRINE HYDROCHLORIDE 200 MCG: 10 INJECTION INTRAVENOUS at 21:20

## 2024-09-23 RX ADMIN — PHENYLEPHRINE HYDROCHLORIDE 200 MCG: 10 INJECTION INTRAVENOUS at 20:56

## 2024-09-23 RX ADMIN — CEFAZOLIN SODIUM 2000 MG: 2 SOLUTION INTRAVENOUS at 19:29

## 2024-09-23 RX ADMIN — FENTANYL CITRATE 50 MCG: 50 INJECTION, SOLUTION INTRAMUSCULAR; INTRAVENOUS at 19:50

## 2024-09-23 ASSESSMENT — PAIN SCALES - GENERAL
PAINLEVEL_OUTOF10: 0
PAINLEVEL_OUTOF10: 6
PAINLEVEL_OUTOF10: 8
PAINLEVEL_OUTOF10: 8
PAINLEVEL_OUTOF10: 9
PAINLEVEL_OUTOF10: 6
PAINLEVEL_OUTOF10: 0

## 2024-09-23 ASSESSMENT — PAIN DESCRIPTION - ORIENTATION
ORIENTATION: LEFT

## 2024-09-23 ASSESSMENT — PAIN DESCRIPTION - LOCATION
LOCATION: LEG
LOCATION: HIP
LOCATION: HIP

## 2024-09-23 ASSESSMENT — PAIN DESCRIPTION - DESCRIPTORS
DESCRIPTORS: SPASM
DESCRIPTORS: ACHING
DESCRIPTORS: ACHING;SPASM

## 2024-09-23 ASSESSMENT — PAIN - FUNCTIONAL ASSESSMENT
PAIN_FUNCTIONAL_ASSESSMENT: INTOLERABLE, UNABLE TO DO ANY ACTIVE OR PASSIVE ACTIVITIES
PAIN_FUNCTIONAL_ASSESSMENT: NONE - DENIES PAIN
PAIN_FUNCTIONAL_ASSESSMENT: NONE - DENIES PAIN
PAIN_FUNCTIONAL_ASSESSMENT: PREVENTS OR INTERFERES WITH ALL ACTIVE AND SOME PASSIVE ACTIVITIES
PAIN_FUNCTIONAL_ASSESSMENT: PREVENTS OR INTERFERES WITH ALL ACTIVE AND SOME PASSIVE ACTIVITIES
PAIN_FUNCTIONAL_ASSESSMENT: FACE, LEGS, ACTIVITY, CRY, AND CONSOLABILITY (FLACC)

## 2024-09-23 NOTE — PROGRESS NOTES
RN case manager and SW met with pt to complete assessment. Pt is alert and oriented and cooperative with assessment. Pt is a 84 year old  male admitted for left hip fracture. Pt lives with his spouse and daughter in their home in [place]. Pt has a cane and grab bars to use at home. Pt was not using any services prior to admission. Pt and spouse both drive and can get to appointments.     Pt is a full code and follows with Dr Jesús Suarez as PCP. Pt does not have advance directives on file but reports that his spouse would be his decision maker if needed. ACP note completed with pt. Pt reports that his medications have been affordable so far.     Pt is scheduled for surgery this evening. Pt would like to have surgery and work with therapy before making a discharge plan. Pt would ultimately prefer to go home with home health services but understands that he may need to consider rehab somewhere. SW to check back with pt again tomorrow regarding decisions surrounding these plans. SW following. Tania LANG LSW 9/23/2024

## 2024-09-23 NOTE — CARE COORDINATION
Case Management Assessment  Initial Evaluation    Date/Time of Evaluation: 9/23/2024 1:10 PM  Assessment Completed by: Sharif Rodriguez RN    If patient is discharged prior to next notation, then this note serves as note for discharge by case management.    Patient Name: Yayo Simeon                   YOB: 1940  Diagnosis: Closed fracture of left hip, initial encounter ([facility]) [S72.002A]  Hip fracture requiring operative repair, left, closed, initial encounter ([facility]) [S72.002A]                   Date / Time: 9/22/2024  1:54 PM    Patient Admission Status: Inpatient   Readmission Risk (Low < 19, Mod (19-27), High > 27): Readmission Risk Score: 14.4    Current PCP: Joshua Suarez MD  PCP verified by CM? (P) Yes (Dr. Jesús Suarez)    Chart Reviewed: Yes      History Provided by: (P) Patient  Patient Orientation: (P) Alert and Oriented, Person, Place, Situation, Self    Patient Cognition: (P) Alert    Hospitalization in the last 30 days (Readmission):  No    If yes, Readmission Assessment in  Navigator will be completed.    Advance Directives:      Code Status: Full Code   Patient's Primary Decision Maker is: (P) Legal Next of Kin      Discharge Planning:    Patient lives with: (P) Spouse/Significant Other, Children Type of Home: (P) House, Skilled Nursing Facility  Primary Care Giver: (P) Self  Patient Support Systems include: (P) Spouse/Significant Other, Children   Current Financial resources: (P) Medicare  Current community resources: (P) None  Current services prior to admission: (P) None            Current DME:              Type of Home Care services:  (P) None    ADLS  Prior functional level: (P) Independent in ADLs/IADLs  Current functional level: (P) Assistance with the following:, Housework, Cooking, Shopping, Mobility, Dressing    PT AM-PAC:   /24  OT AM-PAC:   /24    Family can provide assistance at DC: (P) Yes  Would you like Case Management to discuss the discharge plan with any  other family members/significant others, and if so, who?    Plans to Return to Present Housing: (P) Unknown at present  Other Identified Issues/Barriers to RETURNING to current housing: n  Potential Assistance needed at discharge: (P) Durable Medical Equipment, Skilled Nursing Facility            Potential DME: (P) Walker  Patient expects to discharge to: (P) House  Plan for transportation at discharge: (P) Family    Financial    Payor: MEDICARE / Plan: MEDICARE PART A AND B / Product Type: *No Product type* /     Does insurance require precert for SNF: No    Potential assistance Purchasing Medications: (P) No  Meds-to-Beds request:        [id number] - [place], [place] - [address] [facility] [phone] - F [phone]  [address] [address]  Phone: [phone] Fax: [phone]      Notes:    Factors facilitating achievement of predicted outcomes: Family support, Motivated, Cooperative, Pleasant, Sense of humor, Good insight into deficits, and Has needed Durable Medical Equipment at home    Barriers to discharge: Pain and Lower extremity weakness    Additional Case Management Notes: lives with wife and daughter. Pt states he is unsure at this time of discharge plan, \"want to see how this goes\". Has a cane but not currently using, has grab bars in bathroom. Will need walker at discharge. PCP is Dr Jesús Suarez.states medications are affordable with insurance. Drove prior to admission and wife also drives. Will re-evaluate patient post op for discharge needs.    The Plan for Transition of Care is related to the following treatment goals of Closed fracture of left hip, initial encounter ([facility]) [S72.002A]  Hip fracture requiring operative repair, left, closed, initial encounter ([facility]) [S72.002A]    IF APPLICABLE: The Patient and/or patient representative Yayo and his family were provided with a choice of provider and agrees with the discharge plan. Freedom of choice list with basic dialogue

## 2024-09-23 NOTE — ACP (ADVANCE CARE PLANNING)
Advance Care Planning     Advance Care Planning Activator (Inpatient)  Conversation Note      Date of ACP Conversation: 9/23/2024     Conversation Conducted with: Patient with Decision Making Capacity    ACP Activator: Tania Sexton, MSW, LSW        Health Care Decision Maker:     Current Designated Health Care Decision Maker:     Primary Decision Maker: Nely Simeon - [facility] - [phone]  Click here to complete Healthcare Decision Makers including section of the Healthcare Decision Maker Relationship (ie \"Primary\")  Today we documented Decision Maker(s) consistent with Legal Next of Kin hierarchy.    Care Preferences    Ventilation:  \"If you were in your present state of health and suddenly became very ill and were unable to breathe on your own, what would your preference be about the use of a ventilator (breathing machine) if it were available to you?\"      Would the patient desire the use of ventilator (breathing machine)?: yes    \"If your health worsens and it becomes clear that your chance of recovery is unlikely, what would your preference be about the use of a ventilator (breathing machine) if it were available to you?\"     Would the patient desire the use of ventilator (breathing machine)?: \"Wife to make that decision\"      Resuscitation  \"CPR works best to restart the heart when there is a sudden event, like a heart attack, in someone who is otherwise healthy. Unfortunately, CPR does not typically restart the heart for people who have serious health conditions or who are very sick.\"    \"In the event your heart stopped as a result of an underlying serious health condition, would you want attempts to be made to restart your heart (answer \"yes\" for attempt to resuscitate) or would you prefer a natural death (answer \"no\" for do not attempt to resuscitate)?\" yes       [] Yes   [x] No   Educated Patient / Decision Maker regarding differences between Advance Directives and portable DNR

## 2024-09-23 NOTE — PROGRESS NOTES
Comprehensive Nutrition Assessment    Type and Reason for Visit:  Initial    Nutrition Recommendations/Plan:   Glucerna post op to aid healing.      Malnutrition Assessment:  Malnutrition Status:  At risk for malnutrition (Comment) (09/23/24 0963)    Context:  Acute Illness     Findings of the 6 clinical characteristics of malnutrition:  Energy Intake:  Mild decrease in energy intake (Comment) (NPO)  Weight Loss:  No significant weight loss     Body Fat Loss:  No significant body fat loss     Muscle Mass Loss:  No significant muscle mass loss    Fluid Accumulation:  Mild Extremities   Strength:  Not Performed    Nutrition Assessment:    Increased nutrient needs r/t acute injury or trauma, AEB pending surgical repair left hip. Controlled diabetes per A1C 6.0 with acute glucose values just cresting 150 mg/dl. Some chewing difficulty with newer dentures for which he's been avoiding harder foods. Can chew soft meats, eggs adequately. Weight has trended down some with this situation and he would benefit from ONS addition post operatively (discussed).    Nutrition Related Findings:    active b/s. LLE edema. Wound Type: None       Current Nutrition Intake & Therapies:    Average Meal Intake: Unable to assess (no PO records prior to NPO)  Average Supplements Intake: None Ordered  Diet NPO    Anthropometric Measures:  Height: 185.4 cm (6' 1\")  Ideal Body Weight (IBW): 184 lbs (84 kg)    Admission Body Weight: 84.8 kg (187 lb)  Current Body Weight: 81.4 kg (179 lb 7.3 oz), 97.5 % IBW. Weight Source: Bed Scale  Current BMI (kg/m2): 23.7  Usual Body Weight: 84.8 kg (187 lb)  % Weight Change (Calculated): -4  Weight Adjustment For: No Adjustment                 BMI Categories: Normal Weight (BMI 18.5-24.9)    Estimated Daily Nutrient Needs:  Energy Requirements Based On: Kcal/kg  Weight Used for Energy Requirements: Current  Energy (kcal/day): 9602-8011 (25-30)  Weight Used for Protein Requirements: Current  Protein (g/day):
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
## 2024-09-23 NOTE — PROGRESS NOTES
Spiritual Services Interventions  [id number]   9/23/2024  Sr Rosmery Simeon  84 y.o. year old male    Encounter Summary  Encounter Overview/Reason: Initial Encounter  Service Provided For: Patient  Referral/Consult From: [place]  Last Encounter : 09/23/24  Complexity of Encounter: Moderate  Begin Time: 0940  End Time : 0955  Total Time Calculated: 15 min     Spiritual/Emotional needs  Type: Spiritual Support                    Assessment/Intervention/Outcome  Assessment: Calm  Intervention: Active listening, Prayer (assurance of)/[place]  Outcome: Expressed Gratitude

## 2024-09-23 NOTE — PROGRESS NOTES
Occupational Therapy    Hold OT evaluation until tomorrow, 9/24/24; patient will be undergoing surgery this evening at 1900.  RN aware and in agreement.    Madison Juarez, OTR/L  Date: 9/23/24

## 2024-09-23 NOTE — PROGRESS NOTES
[facility]  Physical Therapy    Date: 2024  Patient Name: Yayo Simeon        : 1940       [] Pt Refusal           [x] Pt Unavailable due to:  PT orders received.  Patient to undergo hip surgery later this date.  Hold evaluation at thist  time and await post op orders.        EDWARD DONIS, PT,  Date: 2024

## 2024-09-23 NOTE — H&P
never used smokeless tobacco.  ETOH:   reports no history of alcohol use.  OCCUPATION:      Family History:   History reviewed. No pertinent family history.    REVIEW OF SYSTEMS:  Constitutional:  negative for  fevers, chills, and malaise  HEENT:  positive for  runny nose  negative for  ear drainage, earaches, nasal congestion, and sore throat  Neck:  No lumps or masses  Cardiac:  negative for  chest pain, palpitations, orthopnea, positive for SOB with work (chronic)  Respiratory:  positive for  dyspnea with exertion  negative for  dyspnea, wheezing, and hemoptysis  Gastrointestinal:  negative for nausea, vomiting, change in bowel habits, and abdominal pain  :  chronic nuno cath  Musculoskeletal:  positive for  arthralgias  Neuro:  negative      Physical Exam:    Vitals: /78   Pulse 83   Temp 97.8 °F (36.6 °C) (Oral)   Resp 18   Ht 1.854 m (6' 1\")   Wt 81.4 kg (179 lb 7.3 oz)   SpO2 95%   BMI 23.68 kg/m²   General appearance: alert, appears stated age and cooperative  Skin: Skin color, texture, turgor normal. No rashes or lesions  HEENT: Head: Normocephalic, no lesions, without obvious abnormality.  Eye: Normal external eye, conjunctiva, lids cornea, VICENTE.  Ears: Normal TM's bilaterally. Normal auditory canals and external ears. Non-tender.  Nose: Normal external nose, mucus membranes and septum.  Pharynx: Dental Hygiene adequate, upper dentures not in. Normal buccal mucosa other than being dry. Normal pharynx.  Neck: no adenopathy, no carotid bruit, and supple, symmetrical, trachea midline  Lungs: clear to auscultation bilaterally  Heart: regular rate and rhythm, S1, S2 normal, and distant  Abdomen: soft, non-tender; bowel sounds normal; no masses,  no organomegaly  Extremities: extremities normal, atraumatic, no cyanosis or edema  Neurologic: Mental status: Alert, oriented, thought content appropriate    CBC:   Recent Labs     09/22/24  1439   WBC 6.4   HGB 12.0*        BMP:    Recent Labs      09/22/24  1439      K 4.5      CO2 28   BUN 22   CREATININE 1.1   GLUCOSE 150*     Hepatic:   Recent Labs     09/22/24  1439   AST 14   ALT 8   BILITOT 0.3   ALKPHOS 50     Troponin: No results for input(s): \"TROPONINI\" in the last 72 hours.  BNP: No results for input(s): \"BNP\" in the last 72 hours.  Lipids: No results for input(s): \"CHOL\", \"HDL\" in the last 72 hours.    Invalid input(s): \"LDLCALCU\"  ABGs: No results found for: \"PHART\", \"PO2ART\", \"ZLK6FVE\"  INR:   Recent Labs     09/22/24  1439   INR 1.0     -----------------------------------------------------------------  Medical Decision Making (MDM) Data:    External documents reviewed:  -  My CXR interpretation: -  My EKG interpretation:   Sinus rhythm with 2nd degree A-V block (Mobitz I)  Left axis deviation  Non-specific intra-ventricular conduction delay  Minimal voltage criteria for LVH, may be normal variant ( [place] product )  Abnormal ECG     Discussed with:  Dr. Ji at check out  Tests considered but not ordered:  -  Heart score:  -  Social Determinants of Health that impact treatment or disposition:  -        Assessment and Plan   Acute subcapital fracture of the left femoral neck.  Dr. Whipple in Ortho consulted.  Placed on Percocet for pain control.  Mobitz type 1 second degree block on ECG - Dr. Sampson consulted for pre op clearance.  Possible UTI - UA will be repeat after nuno is changed.  Chronic urinary retention with chronic nuno cath.   DM II controlled on Metformin XR and Nesina.  HgbA1C 6.0.  Place on Humalog sliding scale.   HTN - controlled on Lisinopril.    GERD - stable on Pepcid.    H/O iron deficiency anemia - on iron replacement.   COPD     Differential Diagnosis:  hip fracture, CAD, Mobitz type 1 block.  Condition is improving / unchanged / worsening:  Unchanged  Condition is at treatment goal:  No  Chronic condition is / is not having mild / moderate, severe exacerbation, progression or side effects of

## 2024-09-23 NOTE — PROGRESS NOTES
Cardiology  Full consult dictated    Cleared for L hip repair  Type II Mobitz 1 AV block (wenckebach) with HR 63 bpm.  Asymptomatic  No hx of chest pain, sob or loss of energy.  Normal LV function by bedside echo EF 60%  HTN  NIDDM    EKG:  Wenckebach with HR 63 bpm    He has no cardiac hx. No sx of chest pain or sob.  Denies any syncope or near syncope.    He does have Wenckebach, but asymptomatic and low chance of progressing heart block.    He is cleared for surgery.  He does have generous right sided chambers and suspect mild pulmonary hypertension.  Will do full echo.    No indication for CST.  If he develops bradycardia during surgery, will respond to Atropine.    Thanks, Rupesh Sampson MD

## 2024-09-23 NOTE — CONSULTS
[facility]                [address] [place] [address]                              CONSULTATION      PATIENT NAME: ELZBIETA RACHEL             : 1940  MED REC NO: [id number]                          ROOM: 0256  ACCOUNT NO: [id number]                       ADMIT DATE: 2024  PROVIDER: Rupesh Sampson MD      CONSULT DATE: 2024    REASON FOR CONSULT:    1. Cardiac clearance for left hip repair.  2. Type 2 AV block, Mobitz I (Wenckebach rhythm).    HISTORY OF PRESENT ILLNESS:  The patient is a pleasant 84-year-old gentleman who is still very active.  He has never had a myocardial infarction or cardiac catheterization and never had a known cardiac issue.  He has been treated for hypertension and for non-insulin-dependent diabetes.    He was working in his yard with a golf cart that he uses to carry his tools.  The brake released on the golf cart and it started down the hill and he grabbed the tail to tailgate to stop it, but it did not stop and he fell to the ground breaking his left femur.  He is admitted and is going to have left hip repair today.    It is noticed on his EKG that he had second-degree Mobitz I heart block on his EKG.  Therefore, I was asked to see him for cardiac clearance.    Again, he denies any cardiac history.  He has had no chest pain, shortness of breath, or loss of energy.  He denies any syncope or near syncope.  He has never been told that he has an abnormal heart rhythm.    He has no PND, orthopnea, or pedal edema.  He was found to have diabetes when he retired from [employer].  His hemoglobin A1c is 6.0 at this point.    CARDIAC RISK FACTORS:  Known CAD:    Hypertension:  Positive.  Peripheral vascular disease:    Hyperlipidemia:  Unknown.  Diabetes:  Negative.  Smoking:  Negative.  Other family members:  Negative.    MEDICATIONS:  Prior to admission, vitamin C 500 mg daily, aspirin 81 mg daily, Celebrex 200 mg daily, iron 5 grains  daily, lisinopril 2.5 mg half a tablet daily, Janumet 50/1000 daily, vitamin B12 500 mcg daily.    PAST MEDICAL AND SURGICAL HISTORY:    1. He has non-insulin-dependent diabetes for many years.  2. Large prostate, chronic indwelling Narayan catheter secondary to urinary retention.  3. Hand tendon surgery in the right hand many years ago.    FAMILY HISTORY:  Negative for coronary artery disease.    SOCIAL HISTORY:  84 years old, .  He has no children.  He does have 1 stepdaughter.  He does not smoke, does not drink alcohol, retired from [employer].  He is still active in the yard.    REVIEW OF SYSTEMS:  Cardiac as above.  Other systems reviewed including constitutional, eyes, ears, nose and throat, cardiovascular, respiratory, GI, , musculoskeletal, integumentary, neurologic, psychiatric, endocrine, hematologic and allergic/immunologic, which were all negative except for what is described above.  No weight loss, weight gain, or change in bowel habits.  No blood in stool.  No fevers, sweats, or chills.    PHYSICAL EXAMINATION:  VITAL SIGNS:  Blood pressure is 135/70 with a heart rate of 62 and regular, respirations are 18, O2 saturation 93% on room air.  GENERAL:  He is a very pleasant 84-year-old gentleman who has pain in his left hip.  He is oriented to person, place and time.  Answers questions appropriately.  SKIN:  No unusual skin changes.  HEENT:  The pupils are equally round and reactive to light and accommodation.  Extraocular movements are intact.  Mucous membranes are dry.  NECK:  No JVD.  Good carotid pulses.  No carotid bruits.  No lymphadenopathy or thyromegaly.  CARDIOVASCULAR:  S1 and S2 are normal.  No S3 or S4.  Soft systolic blowing-type murmur.  No diastolic murmur.  PMI is normal.  No lift, thrust, or pericardial friction rub.  LUNGS:  Quite clear to auscultation and percussion.  ABDOMEN:  Soft and nontender.  Good bowel sounds.  The aorta is not enlarged.  No hepatomegaly,

## 2024-09-24 LAB
ANION GAP SERPL CALCULATED.3IONS-SCNC: 8 MMOL/L (ref 9–17)
BUN SERPL-MCNC: 16 MG/DL (ref 8–23)
BUN/CREAT SERPL: 18 (ref 9–20)
CALCIUM SERPL-MCNC: 8.9 MG/DL (ref 8.6–10.4)
CHLORIDE SERPL-SCNC: 103 MMOL/L (ref 98–107)
CO2 SERPL-SCNC: 27 MMOL/L (ref 20–31)
CREAT SERPL-MCNC: 0.9 MG/DL (ref 0.7–1.2)
ERYTHROCYTE [DISTWIDTH] IN BLOOD BY AUTOMATED COUNT: 13 % (ref 12.1–15.2)
GFR, ESTIMATED: 85 ML/MIN/1.73M2
GLUCOSE BLD-MCNC: 106 MG/DL (ref 65–99)
GLUCOSE BLD-MCNC: 109 MG/DL (ref 65–99)
GLUCOSE BLD-MCNC: 149 MG/DL (ref 65–99)
GLUCOSE BLD-MCNC: 157 MG/DL (ref 65–99)
GLUCOSE BLD-MCNC: 167 MG/DL (ref 65–99)
GLUCOSE SERPL-MCNC: 104 MG/DL (ref 70–99)
HCT VFR BLD AUTO: 34 % (ref 41–53)
HGB BLD-MCNC: 11.5 G/DL (ref 13.5–17.5)
MCH RBC QN AUTO: 30.6 PG (ref 26–34)
MCHC RBC AUTO-ENTMCNC: 33.8 G/DL (ref 31–37)
MCV RBC AUTO: 90.4 FL (ref 80–100)
MICROORGANISM SPEC CULT: NORMAL
PLATELET # BLD AUTO: 167 K/UL (ref 140–450)
PMV BLD AUTO: 10.8 FL (ref 6–12)
POTASSIUM SERPL-SCNC: 4.7 MMOL/L (ref 3.7–5.3)
RBC # BLD AUTO: 3.76 M/UL (ref 4.5–5.9)
SODIUM SERPL-SCNC: 138 MMOL/L (ref 135–144)
SPECIMEN DESCRIPTION: NORMAL
WBC OTHER # BLD: 12.2 K/UL (ref 3.5–11)

## 2024-09-24 PROCEDURE — 1200000000 HC SEMI PRIVATE

## 2024-09-24 PROCEDURE — 2580000003 HC RX 258: Performed by: ORTHOPAEDIC SURGERY

## 2024-09-24 PROCEDURE — 6370000000 HC RX 637 (ALT 250 FOR IP): Performed by: ORTHOPAEDIC SURGERY

## 2024-09-24 PROCEDURE — 6360000002 HC RX W HCPCS: Performed by: ORTHOPAEDIC SURGERY

## 2024-09-24 PROCEDURE — 97166 OT EVAL MOD COMPLEX 45 MIN: CPT

## 2024-09-24 PROCEDURE — 80048 BASIC METABOLIC PNL TOTAL CA: CPT

## 2024-09-24 PROCEDURE — 82947 ASSAY GLUCOSE BLOOD QUANT: CPT

## 2024-09-24 PROCEDURE — 85027 COMPLETE CBC AUTOMATED: CPT

## 2024-09-24 PROCEDURE — 97162 PT EVAL MOD COMPLEX 30 MIN: CPT

## 2024-09-24 PROCEDURE — 36415 COLL VENOUS BLD VENIPUNCTURE: CPT

## 2024-09-24 PROCEDURE — 94761 N-INVAS EAR/PLS OXIMETRY MLT: CPT

## 2024-09-24 RX ORDER — SODIUM CHLORIDE 450 MG/100ML
INJECTION, SOLUTION INTRAVENOUS CONTINUOUS
Status: ACTIVE | OUTPATIENT
Start: 2024-09-24 | End: 2024-09-24

## 2024-09-24 RX ADMIN — Medication 500 MCG: at 07:53

## 2024-09-24 RX ADMIN — LISINOPRIL 2.5 MG: 5 TABLET ORAL at 07:53

## 2024-09-24 RX ADMIN — OXYCODONE HYDROCHLORIDE AND ACETAMINOPHEN 500 MG: 500 TABLET ORAL at 07:53

## 2024-09-24 RX ADMIN — HYDROMORPHONE HYDROCHLORIDE 0.5 MG: 1 INJECTION, SOLUTION INTRAMUSCULAR; INTRAVENOUS; SUBCUTANEOUS at 00:30

## 2024-09-24 RX ADMIN — FAMOTIDINE 20 MG: 20 TABLET, FILM COATED ORAL at 20:33

## 2024-09-24 RX ADMIN — HYDROMORPHONE HYDROCHLORIDE 0.5 MG: 1 INJECTION, SOLUTION INTRAMUSCULAR; INTRAVENOUS; SUBCUTANEOUS at 20:37

## 2024-09-24 RX ADMIN — OXYCODONE HYDROCHLORIDE AND ACETAMINOPHEN 1 TABLET: 5; 325 TABLET ORAL at 07:53

## 2024-09-24 RX ADMIN — FAMOTIDINE 20 MG: 20 TABLET, FILM COATED ORAL at 07:53

## 2024-09-24 RX ADMIN — CEFTRIAXONE SODIUM 1000 MG: 1 INJECTION, POWDER, FOR SOLUTION INTRAMUSCULAR; INTRAVENOUS at 08:01

## 2024-09-24 RX ADMIN — CYCLOBENZAPRINE 5 MG: 10 TABLET, FILM COATED ORAL at 20:33

## 2024-09-24 RX ADMIN — OXYCODONE HYDROCHLORIDE AND ACETAMINOPHEN 1 TABLET: 5; 325 TABLET ORAL at 14:04

## 2024-09-24 RX ADMIN — METFORMIN HYDROCHLORIDE 1000 MG: 500 TABLET, EXTENDED RELEASE ORAL at 07:55

## 2024-09-24 RX ADMIN — SODIUM CHLORIDE, PRESERVATIVE FREE 10 ML: 5 INJECTION INTRAVENOUS at 08:02

## 2024-09-24 RX ADMIN — HYDROMORPHONE HYDROCHLORIDE 0.5 MG: 1 INJECTION, SOLUTION INTRAMUSCULAR; INTRAVENOUS; SUBCUTANEOUS at 11:41

## 2024-09-24 RX ADMIN — ALOGLIPTIN 12.5 MG: 12.5 TABLET, FILM COATED ORAL at 07:53

## 2024-09-24 RX ADMIN — SODIUM CHLORIDE, PRESERVATIVE FREE 10 ML: 5 INJECTION INTRAVENOUS at 20:33

## 2024-09-24 RX ADMIN — ENOXAPARIN SODIUM 40 MG: 100 INJECTION SUBCUTANEOUS at 20:33

## 2024-09-24 RX ADMIN — HYDROMORPHONE HYDROCHLORIDE 0.5 MG: 1 INJECTION, SOLUTION INTRAMUSCULAR; INTRAVENOUS; SUBCUTANEOUS at 07:33

## 2024-09-24 RX ADMIN — CYCLOBENZAPRINE 5 MG: 10 TABLET, FILM COATED ORAL at 14:04

## 2024-09-24 RX ADMIN — FERROUS SULFATE TAB 325 MG (65 MG ELEMENTAL FE) 325 MG: 325 (65 FE) TAB at 07:53

## 2024-09-24 RX ADMIN — HYDROMORPHONE HYDROCHLORIDE 0.5 MG: 1 INJECTION, SOLUTION INTRAMUSCULAR; INTRAVENOUS; SUBCUTANEOUS at 15:49

## 2024-09-24 ASSESSMENT — PAIN SCALES - GENERAL
PAINLEVEL_OUTOF10: 7
PAINLEVEL_OUTOF10: 10
PAINLEVEL_OUTOF10: 8
PAINLEVEL_OUTOF10: 0
PAINLEVEL_OUTOF10: 0
PAINLEVEL_OUTOF10: 10
PAINLEVEL_OUTOF10: 0
PAINLEVEL_OUTOF10: 5
PAINLEVEL_OUTOF10: 5
PAINLEVEL_OUTOF10: 9
PAINLEVEL_OUTOF10: 0
PAINLEVEL_OUTOF10: 6

## 2024-09-24 ASSESSMENT — PAIN DESCRIPTION - LOCATION
LOCATION: HIP

## 2024-09-24 ASSESSMENT — PAIN DESCRIPTION - DESCRIPTORS
DESCRIPTORS: ACHING
DESCRIPTORS: ACHING;DISCOMFORT
DESCRIPTORS: ACHING
DESCRIPTORS: SHARP;SHOOTING

## 2024-09-24 ASSESSMENT — PAIN - FUNCTIONAL ASSESSMENT
PAIN_FUNCTIONAL_ASSESSMENT: PREVENTS OR INTERFERES WITH ALL ACTIVE AND SOME PASSIVE ACTIVITIES
PAIN_FUNCTIONAL_ASSESSMENT: INTOLERABLE, UNABLE TO DO ANY ACTIVE OR PASSIVE ACTIVITIES
PAIN_FUNCTIONAL_ASSESSMENT: PREVENTS OR INTERFERES WITH ALL ACTIVE AND SOME PASSIVE ACTIVITIES
PAIN_FUNCTIONAL_ASSESSMENT: PREVENTS OR INTERFERES SOME ACTIVE ACTIVITIES AND ADLS
PAIN_FUNCTIONAL_ASSESSMENT: NONE - DENIES PAIN
PAIN_FUNCTIONAL_ASSESSMENT: PREVENTS OR INTERFERES WITH ALL ACTIVE AND SOME PASSIVE ACTIVITIES
PAIN_FUNCTIONAL_ASSESSMENT: NONE - DENIES PAIN
PAIN_FUNCTIONAL_ASSESSMENT: PREVENTS OR INTERFERES SOME ACTIVE ACTIVITIES AND ADLS
PAIN_FUNCTIONAL_ASSESSMENT: INTOLERABLE, UNABLE TO DO ANY ACTIVE OR PASSIVE ACTIVITIES
PAIN_FUNCTIONAL_ASSESSMENT: NONE - DENIES PAIN

## 2024-09-24 ASSESSMENT — PAIN DESCRIPTION - FREQUENCY
FREQUENCY: CONTINUOUS
FREQUENCY: CONTINUOUS

## 2024-09-24 ASSESSMENT — PAIN DESCRIPTION - ORIENTATION
ORIENTATION: LEFT
ORIENTATION: LEFT;INNER
ORIENTATION: LEFT

## 2024-09-24 ASSESSMENT — PAIN DESCRIPTION - PAIN TYPE
TYPE: SURGICAL PAIN
TYPE: SURGICAL PAIN

## 2024-09-24 ASSESSMENT — PAIN DESCRIPTION - ONSET
ONSET: ON-GOING
ONSET: ON-GOING

## 2024-09-24 NOTE — PLAN OF CARE
tasks  Long Term Goal 4: Up/down steps with HR and CGA    Upon Swingbed Transfer this Plan of Care will be followed until revision is complete.    EDWARD DONIS, PT,    Date: 9/24/2024

## 2024-09-24 NOTE — PROGRESS NOTES
Pt arrives to room 256 from PACU. Report received from MIGUEL Norwood. Pt alert to self. Disoriented to situation, time, and place. Pt is agitated and \"wants to be left alone.\" This nurse attempts to re-orient patient as he believes he is at the \"junk yard.\" Unable to re-orient at this time as patient continuously repeats, \"Quit wasting your time,\" and \"You're lying to me.\" Pt gets increasingly agitated with staff as we attempt to obtain vital signs and get comfortable in room and bed. VS WNL. FSBS 85. Telemetry shows NSR with a BBB. Pt denies all pain. Dressing to left hip is clean, dry, and intact. Pt able to move all extremities. Abductor pillow in place. Pt denies wanting water at this time. Left PIV flushed and infusing without incident.

## 2024-09-24 NOTE — THERAPY EVALUATION
[facility]  Physical Therapy  Evaluation  Date: 2024  Patient Name: Yayo Simeon        MRN: [id number]    : 1940  (84 y.o.)  Gender: male   Referring Practitioner: Dr. Whipple  Diagnosis: Left hip fracture  Additional Pertinent Hx: Patient fell at home sustaining left hip fracture and underwent ORIF for left hip hemiarthroplasty on 24.  Referred to PT for WBAT/FWB mobility   Past Medical History:   Diagnosis Date    Chronic indwelling Narayan catheter     Diabetes type 2, controlled (HCC)     Enlarged prostate     Neuropathy     Urinary retention      Past Surgical History:   Procedure Laterality Date    HAND TENDON SURGERY Right     Right hand surgery    HIP ARTHROPLASTY Left     [place], Dr. Whipple, 2024       Restrictions  Hip Precautions: No hip flexion > 90 degrees, No ADduction      Subjective         Pain Level: 8    Orientation  Overall Orientation Status: Within Normal Limits    Home Living / Prior Level of Function  Social/Functional History  Lives With: Spouse, Daughter  Type of Home: House  Home Layout: One level, Work area in basement  Home Access: Ramped entrance  Bathroom Shower/Tub: Tub/Shower unit  Bathroom Toilet: Handicap height  Bathroom Equipment: Grab bars in shower, Grab bars around toilet  Home Equipment: Cane, Walker - Rolling  Receives Help From: Family  ADL Assistance: Independent  Homemaking Assistance: Independent  Ambulation Assistance: Independent  Transfer Assistance: Independent  Active : Yes  Mode of Transportation: Car  Occupation: Retired  Hearing  Hearing: Within functional limits    Objective                       Strength RLE  Strength RLE: WFL  Comment: Generally 5/5  Strength LLE  Comment: No tformally tested following recent surgery; fair- quad set contraction and unable to complete SAQ  Strength RUE  Strength RUE: WFL  Strength LUE  Strength LUE: WFL             Bed mobility  Supine to Sit: Maximum assistance, 2 Person assistance  Sit  to Supine: 2 Person assistance, Maximum assistance  Supine to Sit: Moderate assistance (x 2)  Sit to Supine: Moderate assistance, Maximal assistance (x 2)  Sit to Stand: Moderate Assistance  Stand to Sit: Moderate Assistance  Bed to Chair: Moderate assistance (x 2) - Pt transferred to BSC using wh walker and 2 mod assist.  Became diaphoretic and dizzy and assisted back to bed using SaraSteady.  Nursing notified and assisted with transfer.  /58 once in bed.              Balance  Sitting - Static: Fair  Standing - Static: Fair    Assessment  Activity Tolerance: Patient tolerated evaluation without incident   Chart Reviewed: Yes  Assessment: Patient admitted following fall at home sustaining left hip fracture. Underwent left hip ORIF for hemiarthroplasty on 9/23/24.  Referred to PT with hip precautions and WBAT/FWB.   Patient requires 2 mod assist for transfers to edge of bed and exhibited fair sitting balance.  Transferred to standing with 2 mod assist and then to BS using wh walker.  Patient become diaphoretic and dizzy;  assisted back to bed using Katey Steady.   Educated on hip precautions and ankle pumps/ quad sets.  Plan to progress with ex and functional mobility.  Would benefit from additional therapy including swingbed.  Therapy Prognosis: Good  Discharge Recommendations: Subacute/Skilled Nursing Facility     Type of Devices: Nurse notified, Left in bed, Gait belt, Call light within reach     Plan  Frequency: 1-2x/day Daily M-F, 1x/day Sat-Sun    Duration: 14 days     Goals  Short Term Goals  Time Frame for Short Term Goals: 5 days - expires 9/28/24  Short Term Goal 1: Transfer supine to sit with min/mod assist x 1  Short Term Goal 2: Transfer bed to chair using wh walker and min/mod assist x 1  Short Term Goal 3: Sit at bedside with good balance to complete self-care tasks  Short Term Goal 4: Recall of hip precautions    Long Term Goals  Time Frame for Long Term Goals : 14 days - expires 10/7/24  Long

## 2024-09-24 NOTE — PLAN OF CARE
Problem: Pain  Goal: Verbalizes/displays adequate comfort level or baseline comfort level  Outcome: Progressing     Problem: Safety - Adult  Goal: Free from fall injury  Outcome: Progressing     Problem: Skin/Tissue Integrity  Goal: Absence of new skin breakdown  Description: 1.  Monitor for areas of redness and/or skin breakdown  2.  Assess vascular access sites hourly  3.  Every 4-6 hours minimum:  Change oxygen saturation probe site  4.  Every 4-6 hours:  If on nasal continuous positive airway pressure, respiratory therapy assess nares and determine need for appliance change or resting period.  Outcome: Progressing     Problem: Cardiovascular - Adult  Goal: Absence of cardiac dysrhythmias or at baseline  Outcome: Progressing     Problem: Musculoskeletal - Adult  Goal: Maintain proper alignment of affected body part  Outcome: Progressing     Problem: Genitourinary - Adult  Goal: Urinary catheter remains patent  Outcome: Progressing

## 2024-09-24 NOTE — PROGRESS NOTES
RN case manager and SW met with pt to discuss discharge planning further. Pt states that he is considering options but has not made a decision yet. SW provided freedom of choice lists for HH and SNF facilities. Pt states that he does not want to be trapped somewhere for weeks. SW informed that he would not be held against his will and he could discharge when he felt he was able to move about his home safely. Pt asks questions about swing bed and HH and SW answers these for him. Pt states that he will think about it some more and SW to check back with pt again tomorrow after he has had therapy and sees how he is feeling. SW following. Tania LAWSONW 9/24/2024

## 2024-09-24 NOTE — THERAPY EVALUATION
[facility]  Phone: [phone]    Occupational Therapy Evaluation    Date: 2024  Patient Name: Yayo Simeon        MRN: [id number]    : 1940  (84 y.o.)  Gender: male   Referring Practitioner: Dr. Mena  Diagnosis: Left hip fracture    Additional Pertinent Hx: Admitted to [facility] on 24 due to a left hip fracture. Underwent a left hip hemiarthroplasty by Dr. Whipple yesterday evening and was deemed WBAT. Referred to OT services to assess ability to care for self.    Past Medical History:   Diagnosis Date    Chronic indwelling Narayan catheter     Diabetes type 2, controlled (HCC)     Enlarged prostate     Neuropathy     Urinary retention      Past Surgical History:   Procedure Laterality Date    HAND TENDON SURGERY Right     Right hand surgery    HIP ARTHROPLASTY Left     [place], Dr. Whipple, 2024     Subjective:     Subjective: Patient was lying supine in bed upon OT arrival. Was agreeable to OT evaluation.    Objective:     Social/Functional History:  Lives With: Spouse, Daughter  Type of Home: House  Home Layout: One level, Work area in basement (12-13 steps into basement with railing on left side when ascending stairs)  Home Access: Ramped entrance  Bathroom Shower/Tub: Tub/Shower unit  Bathroom Toilet: Handicap height  Bathroom Equipment: Grab bars in shower, Grab bars around toilet  Home Equipment: Cane, Walker - Rolling    PLOF:  Receives Help From: Family  ADL Assistance: Independent  Homemaking Assistance: Independent  Ambulation Assistance: Independent  Transfer Assistance: Independent    ADLs:  Feeding: Setup  Grooming: Stand by assistance (in sitting)  UE Bathing: Stand by assistance (in sitting)  LE Bathing: Maximum assistance  UE Dressing: Stand by assistance  LE Dressing: Maximum assistance  Toileting: Maximum assistance    Transfers:  Supine to Sit: Maximum assistance, 2 Person assistance  Sit to Supine: 2 Person assistance, Maximum assistance  Sit to

## 2024-09-24 NOTE — ANESTHESIA POST-OP
Patient lying in bed awake. Full sensation of affected extremity. Oriented X3 this a.m. Noted that he knew he was confused after waking up from anesthesia post surgery last night. Says he is receiving pain medicine via the IV when he needs it. His vital signs are stable. No complaints with his anesthetic care at this time.

## 2024-09-24 NOTE — PROGRESS NOTES
[facility]  Physical Therapy    Date: 2024  Patient Name: Yayo Simeon        : 1940       [x] Pt Refusal  - Attempted PT evaluation and functional mobility. Patient declined noting significant pain just with bed mobility and raising HOB.    Will attempt later this afternoon.   Discussed pain medication routine with nursing to coordinate with therapy.           [] Pt Unavailable due to:          EDWARD DONIS, PT,  Date: 2024

## 2024-09-24 NOTE — PROGRESS NOTES
Department of Orthopedic Surgery        SUBJECTIVE  Hip pain improved from yesterday.    OBJECTIVE  BP (!) 124/55   Pulse 92   Temp 98.3 °F (36.8 °C) (Oral)   Resp 16   Ht 1.854 m (6' 1\")   Wt 81.4 kg (179 lb 7.3 oz)   SpO2 94%   BMI 23.68 kg/m²     Physical    Thigh soft  D.NVI  Dressing C/D/E    LABS   Hemoglobin/Hematocrit:    Lab Results   Component Value Date/Time    HGB 11.5 09/24/2024 03:45 AM    HCT 34.0 09/24/2024 03:45 AM            ASSESSMENT AND PLAN      POD#1 from Left hip hemiarthroplasty for femoral neck fracture doing well  PT  DVT prophylaxis    EZEKIEL SUERO MD  5:56 PM  09/24/24

## 2024-09-24 NOTE — PROGRESS NOTES
in the last 72 hours.  Lipids: No results for input(s): \"CHOL\", \"HDL\" in the last 72 hours.    Invalid input(s): \"LDLCALCU\"  INR:   Recent Labs     09/22/24  1439   INR 1.0         Objective:   Vitals: BP (!) 111/45   Pulse 80   Temp 97.4 °F (36.3 °C) (Temporal)   Resp 16   Ht 1.854 m (6' 1\")   Wt 81.4 kg (179 lb 7.3 oz)   SpO2 94%   BMI 23.68 kg/m²   General appearance: alert and cooperative with exam  HEENT: Head: Normocephalic, no lesions, without obvious abnormality.  Eye: Normal external eye, conjunctiva, lids cornea, VICENTE.  Nose: Normal external nose, mucus membranes and septum.  Neck: no adenopathy, no carotid bruit, and supple, symmetrical, trachea midline  Lungs: clear to auscultation bilaterally  Heart: regular rate and rhythm, S1, S2 normal, and II/VI systolic murmur.  Abdomen: soft, non-tender; bowel sounds normal; no masses,  no organomegaly  Extremities: Post op left hip, no calf tenderness.  Neurologic: Mental status: Alert, oriented, thought content appropriate    ----------------------------------------------------------------------------------------------------------------------  Medical Decision Making (MDM) Data:    External documents reviewed:  -  My CXR interpretation: -  My EKG interpretation: -  Discussed with:  -  Tests considered but not ordered:  -  Heart score:  -  Social Determinants of Health that impact treatment or disposition:  -    Assessment and Plan:   Acute subcapital fracture of the left femoral neck.  Dr. Whipple in Ortho consulted and left hip hemiarthroplasty performed on 9/23.   Placed on IV Dilaudid and oral Percocet for pain control.  Mobitz type 1 second degree block on ECG - Dr. Sampson consulted.     UTI - UA and culture repeated after nuno changed.  Placed on IV Rocephin.    Chronic urinary retention with chronic nuno cath.   DM II controlled on Metformin XR and Nesina.  HgbA1C 6.0.  Place on Humalog sliding scale.   HTN - controlled on Lisinopril.    GERD -  stable on Pepcid.    H/O iron deficiency anemia - on iron replacement.   COPD     Plan:  Saline lock IV if he tolerates breakfast well.    Start therapy today.  Consult skilled care coordinator to discuss swing bed with patient.     Differential Diagnosis:  -  Condition is improving / unchanged / worsening:  Improving  Condition is at treatment goal:  No  Chronic condition is / is not having mild / moderate, severe exacerbation, progression or side effects of treatment:  -    Shared decision making:  -    Code status and discussions:  Full    Medical Necessity:  In patient is appropriate for this patient secondary to the need for pain control and start of therapy.        DVT prophylaxis:   [x] Lovenox   [] SCDs   [] SQ Heparin   [] Encourage ambulation, low risk for DVT, no chemical or mechanical    prophylaxis necessary      [] Already on Anticoagulation    Patient Active Problem List:     Left hip pain     Hip fracture requiring operative repair, left, closed, initial encounter (HCC)        Andi Mena MD, MD  [facility] Hospitalist

## 2024-09-24 NOTE — PLAN OF CARE
[facility]  Phone: [phone]    Inpatient Occupational Therapy Plan of Care  OT Orders Received and Evaluation Complete    Date: 2024  Patient Name: Yayo Simeon        MRN: [id number]    : 1940  (84 y.o.)  Referring Practitioner: Dr. Mena  Diagnosis: Left hip fracture  Treatment Diagnosis: Left hip fracture    Identified Problem Areas:     Performance deficits / Impairments: Decreased functional mobility , Decreased ADL status, Decreased strength, Decreased endurance, Decreased sensation, Decreased balance, Decreased high-level IADLs, Decreased posture     Justification for Skilled Services:     [x] Complete Daily Tasks Safely  [x] Improve Balance   [x]  Return to Prior Level of Function  [x] Family/Caregiver Education    [x] Improve UE strength  [x] Patient Education: [x]Adaptive Equipment   [x]Home Exercise Program and Progression    Treatment Plan:     Times Per Week: 2-3x  Discharge recommendation: SNF       [] Modalities:  [x] Therapeutic Exercise   [x] Therapeutic Activity  [] Splinting:     [] Home Safety Evaluation         [x] ADL Retraining                       [] Muscle Re-education [] Cognitive Retraining            [] Sensory Integration  [x] Patient Education [x] Home Exercise Program [x] Fine Motor Coordination    Goals:     Short term goals:  Time Frame for Short Term Goals: 4 days (24)  Short Term Goal 1: Patient will complete a commode transfer while using DME PRN and adhering to hip precautions with MIN A x1.  Short Term Goal 2: Patient will complete upper body dressing and/or bathing while using adaptive equipment/techniques PRN and adhering to hip precautions with SUP/SETUP.  Short Term Goal 3: Patient will complete lower body dressing and/or bathing while using adaptive equipment/techniques PRN and adhering to hip precautions with MIN A.  Short Term Goal 4: To increase activity tolerance for ADLs, patient will engage in 10 minutes of BUE ther ex/ther act  while adhering to hip precautions without rest breaks.  Short Term Goal 5: To increase independence and safety with IADLs, patient will tolerate static/dynamic standing x5 minutes while adhering to hip precautions without LOB.    Long term goals:  STGs=LTGs         Upon swingbed transfer, this plan of care will be followed until revision is completed.    Madison Juarez, OTR/L                      Date: 9/24/2024

## 2024-09-24 NOTE — PLAN OF CARE
Problem: Discharge Planning  Goal: Discharge to home or other facility with appropriate resources  Outcome: Progressing     Problem: Pain  Goal: Verbalizes/displays adequate comfort level or baseline comfort level  9/24/2024 0907 by Hope Brunner RN  Outcome: Progressing  9/24/2024 0000 by Collette Mckinnon RN  Outcome: Progressing     Problem: Safety - Adult  Goal: Free from fall injury  9/24/2024 0907 by Hope Brunner RN  Outcome: Progressing  9/24/2024 0000 by Collette Mckinnon RN  Outcome: Progressing     Problem: Skin/Tissue Integrity  Goal: Absence of new skin breakdown  Description: 1.  Monitor for areas of redness and/or skin breakdown  2.  Assess vascular access sites hourly  3.  Every 4-6 hours minimum:  Change oxygen saturation probe site  4.  Every 4-6 hours:  If on nasal continuous positive airway pressure, respiratory therapy assess nares and determine need for appliance change or resting period.  9/24/2024 0907 by Hope Brunner RN  Outcome: Progressing  9/24/2024 0000 by Collette Mckinnon RN  Outcome: Progressing     Problem: ABCDS Injury Assessment  Goal: Absence of physical injury  Outcome: Progressing     Problem: Chronic Conditions and Co-morbidities  Goal: Patient's chronic conditions and co-morbidity symptoms are monitored and maintained or improved  Outcome: Progressing     Problem: Nutrition Deficit:  Goal: Optimize nutritional status  Outcome: Progressing     Problem: Cardiovascular - Adult  Goal: Maintains optimal cardiac output and hemodynamic stability  Outcome: Progressing  Goal: Absence of cardiac dysrhythmias or at baseline  9/24/2024 0907 by Hope Brunner RN  Outcome: Progressing  9/24/2024 0000 by Collette Mckinnon RN  Outcome: Progressing     Problem: Skin/Tissue Integrity - Adult  Goal: Skin integrity remains intact  Outcome: Progressing  Goal: Incisions, wounds, or drain sites healing without S/S of infection  Outcome: Progressing  Goal: Oral mucous membranes remain intact  Outcome:

## 2024-09-24 NOTE — OP NOTE
Operative Note      Patient: Yayo Simeon  YOB: 1940  MRN: [id number]    Date of Procedure: 9/23/2024    Pre-Op Diagnosis Codes:   Left hip femoral neck fracture    Post-Op Diagnosis: Same       Procedure(s):  LEFT HIP HEMIARTHROPLASTY    Surgeon(s):  Stevie Whipple MD    Assistant:   * No surgical staff found *    Anesthesia: Choice    Estimated Blood Loss (mL):  50     Complications: None    Specimens:   * No specimens in log *    Implants:  Implant Name Type Inv. Item Serial No.  Lot No. LRB No. Used Action   CEMENT BNE 40GM W/ GENT HI VISC RADPQ FOR REV SURG - [id number]  CEMENT BNE 40GM W/ GENT HI VISC RADPQ FOR REV SURG  [employer]- [id number] Left 1 Implanted   CEMENT BNE 40GM HI VISC RADPQ FOR REV SURG - [id number]  CEMENT BNE 40GM HI VISC RADPQ FOR REV SURG  [employer]- [id number] Left 1 Implanted   SHELL UPLR [id number] FEM HIP CO CHROM MOLYBDENUM ALLY MOD - [id number]  SHELL UPLR [id number] FEM HIP CO CHROM MOLYBDENUM ALLY MOD  [employer]- [id number] Left 1 Implanted   VERSYS DISTAL CENTRALIZER 11MM - [id number]  VERSYS DISTAL CENTRALIZER 11MM  [employer]- [id number] Left 1 Implanted   STEM FEM DIA9MM STD OFFSET HIP CO CHROM ECHO FX - [id number]  STEM FEM DIA9MM STD OFFSET HIP CO CHROM ECHO FX  [employer]- [id number] Left 1 Implanted   INSERT FEM NK +6MM HIP CO CHROM MOLYBDENUM ALLY TAPR 1 ENDO - [id number]  INSERT FEM NK +6MM HIP CO CHROM MOLYBDENUM ALLY TAPR 1 ENDO  [facility] [id number] Left 1 Implanted         Drains:   Urinary Catheter 09/23/24 Narayan (Active)   $ Urethral catheter insertion $ Not inserted for procedure 09/23/24 0648   Catheter Indications Urinary retention (acute or chronic), continuous bladder irrigation or bladder outlet obstruction 09/23/24 1430   Site Assessment Pink 09/23/24 1430   Urine Color Yellow 09/23/24 1430   Urine Appearance Sediment 09/23/24 1430  directed by the osteotomy cutting guide.  Femoral head was removed and sized to 53 mm.  Canal was next repaired for implantation of the final component.  Box osteotome was used followed by canal finder and lateralizing reamer.  Sequential broaching was performed for a size 9 mm Akbar/Biomet Echo femoral hip fracture system.  Trial neck sizes were placed and the hip was reduced and ultimately a +6 mm neck length was decided upon.  Trial components were removed.  Cement was prepared and standard fashion.  Canal was brushed, irrigated and dried.  Distal poly plug was placed and then cement was placed using thumb pressurization.  The final 9 mm stem was placed and held in position to the cement hardened.  The 53 mm Endo head with a +6 mm taper was then placed and tamped into position.  Hip was reduced in length legs were found to be appropriate and hip was stable through range of motion.  Capsule was repaired with a #2 FiberWire suture.  Short external rotators were repaired through drill holes tied over a bony bridge over the greater trochanter.  Fascia was repaired with a #2 FiberWire.  Skin was closed in standard fashion in layers.  Sterile dressing was placed.  Hip abduction pillow was placed.  Patient was awakened and brought to the recovery room in stable condition.  There were no intraoperative or immediate postoperative complications.    Electronically signed by EZEKIEL SUERO MD on 9/23/2024 at 10:05 PM

## 2024-09-24 NOTE — PROGRESS NOTES
Pt A/O x4 with occasional confusion. Pt is easily re-oriented. Pt is pleasant and brightens with approach. Pt reports, \"I was dreaming earlier, I am sorry if I was mean.\" Pt reports 10/10 left hip pain r/t trying to move it when waking up disoriented. Pt educated on abductor pillow and its reason for being placed. Pt verbalized understanding. PRN Dilaudid administered per orders. Pt tolerated well.

## 2024-09-25 ENCOUNTER — HOSPITAL ENCOUNTER (INPATIENT)
Age: 84
LOS: 8 days | Discharge: HOME HEALTH | End: 2024-10-03
Attending: INTERNAL MEDICINE | Admitting: INTERNAL MEDICINE
Payer: MEDICARE

## 2024-09-25 VITALS
HEART RATE: 94 BPM | DIASTOLIC BLOOD PRESSURE: 53 MMHG | OXYGEN SATURATION: 95 % | SYSTOLIC BLOOD PRESSURE: 112 MMHG | TEMPERATURE: 97.2 F | HEIGHT: 73 IN | WEIGHT: 179.45 LBS | RESPIRATION RATE: 16 BRPM | BODY MASS INDEX: 23.78 KG/M2

## 2024-09-25 DIAGNOSIS — S72.002A HIP FRACTURE REQUIRING OPERATIVE REPAIR, LEFT, CLOSED, INITIAL ENCOUNTER (HCC): Primary | ICD-10-CM

## 2024-09-25 PROBLEM — R53.1 GENERALIZED WEAKNESS: Status: ACTIVE | Noted: 2024-09-25

## 2024-09-25 LAB
ERYTHROCYTE [DISTWIDTH] IN BLOOD BY AUTOMATED COUNT: 13.1 % (ref 12.1–15.2)
GLUCOSE BLD-MCNC: 119 MG/DL (ref 65–99)
GLUCOSE BLD-MCNC: 121 MG/DL (ref 65–99)
GLUCOSE BLD-MCNC: 138 MG/DL (ref 65–99)
GLUCOSE BLD-MCNC: 144 MG/DL (ref 65–99)
HCT VFR BLD AUTO: 29.7 % (ref 41–53)
HGB BLD-MCNC: 10.1 G/DL (ref 13.5–17.5)
MCH RBC QN AUTO: 30.1 PG (ref 26–34)
MCHC RBC AUTO-ENTMCNC: 34 G/DL (ref 31–37)
MCV RBC AUTO: 88.7 FL (ref 80–100)
MICROORGANISM SPEC CULT: NORMAL
PLATELET # BLD AUTO: 154 K/UL (ref 140–450)
PMV BLD AUTO: 10.6 FL (ref 6–12)
RBC # BLD AUTO: 3.35 M/UL (ref 4.5–5.9)
SPECIMEN DESCRIPTION: NORMAL
WBC OTHER # BLD: 11.9 K/UL (ref 3.5–11)

## 2024-09-25 PROCEDURE — 97535 SELF CARE MNGMENT TRAINING: CPT

## 2024-09-25 PROCEDURE — 94761 N-INVAS EAR/PLS OXIMETRY MLT: CPT

## 2024-09-25 PROCEDURE — 36415 COLL VENOUS BLD VENIPUNCTURE: CPT

## 2024-09-25 PROCEDURE — 6370000000 HC RX 637 (ALT 250 FOR IP): Performed by: ORTHOPAEDIC SURGERY

## 2024-09-25 PROCEDURE — 85027 COMPLETE CBC AUTOMATED: CPT

## 2024-09-25 PROCEDURE — 97530 THERAPEUTIC ACTIVITIES: CPT

## 2024-09-25 PROCEDURE — 2580000003 HC RX 258: Performed by: ORTHOPAEDIC SURGERY

## 2024-09-25 PROCEDURE — 82947 ASSAY GLUCOSE BLOOD QUANT: CPT

## 2024-09-25 PROCEDURE — 1200000002 HC SEMI PRIVATE SWING BED

## 2024-09-25 PROCEDURE — 6360000002 HC RX W HCPCS: Performed by: ORTHOPAEDIC SURGERY

## 2024-09-25 PROCEDURE — 2580000003 HC RX 258: Performed by: INTERNAL MEDICINE

## 2024-09-25 PROCEDURE — 6370000000 HC RX 637 (ALT 250 FOR IP): Performed by: INTERNAL MEDICINE

## 2024-09-25 RX ORDER — OXYCODONE AND ACETAMINOPHEN 5; 325 MG/1; MG/1
1 TABLET ORAL EVERY 6 HOURS PRN
Status: DISCONTINUED | OUTPATIENT
Start: 2024-09-25 | End: 2024-10-03 | Stop reason: HOSPADM

## 2024-09-25 RX ORDER — MAGNESIUM SULFATE IN WATER 40 MG/ML
2000 INJECTION, SOLUTION INTRAVENOUS PRN
Status: DISCONTINUED | OUTPATIENT
Start: 2024-09-25 | End: 2024-09-30

## 2024-09-25 RX ORDER — DEXTROSE MONOHYDRATE 100 MG/ML
INJECTION, SOLUTION INTRAVENOUS CONTINUOUS PRN
Status: DISCONTINUED | OUTPATIENT
Start: 2024-09-25 | End: 2024-09-30

## 2024-09-25 RX ORDER — ONDANSETRON 2 MG/ML
4 INJECTION INTRAMUSCULAR; INTRAVENOUS EVERY 6 HOURS PRN
Status: CANCELLED | OUTPATIENT
Start: 2024-09-25

## 2024-09-25 RX ORDER — POTASSIUM CHLORIDE 750 MG/1
40 TABLET, EXTENDED RELEASE ORAL PRN
Status: CANCELLED | OUTPATIENT
Start: 2024-09-25

## 2024-09-25 RX ORDER — CYCLOBENZAPRINE HCL 10 MG
5 TABLET ORAL EVERY 6 HOURS PRN
Status: CANCELLED | OUTPATIENT
Start: 2024-09-25

## 2024-09-25 RX ORDER — OXYCODONE AND ACETAMINOPHEN 5; 325 MG/1; MG/1
1 TABLET ORAL EVERY 6 HOURS PRN
Status: CANCELLED | OUTPATIENT
Start: 2024-09-25

## 2024-09-25 RX ORDER — FERROUS SULFATE 325(65) MG
325 TABLET ORAL
Status: DISCONTINUED | OUTPATIENT
Start: 2024-09-26 | End: 2024-10-03 | Stop reason: HOSPADM

## 2024-09-25 RX ORDER — INSULIN LISPRO 100 [IU]/ML
0-4 INJECTION, SOLUTION INTRAVENOUS; SUBCUTANEOUS
Status: DISCONTINUED | OUTPATIENT
Start: 2024-09-25 | End: 2024-09-26

## 2024-09-25 RX ORDER — SODIUM CHLORIDE 0.9 % (FLUSH) 0.9 %
5-40 SYRINGE (ML) INJECTION EVERY 12 HOURS SCHEDULED
Status: DISCONTINUED | OUTPATIENT
Start: 2024-09-25 | End: 2024-09-30

## 2024-09-25 RX ORDER — LISINOPRIL 5 MG/1
2.5 TABLET ORAL DAILY
Status: DISCONTINUED | OUTPATIENT
Start: 2024-09-26 | End: 2024-10-03 | Stop reason: HOSPADM

## 2024-09-25 RX ORDER — SODIUM CHLORIDE 0.9 % (FLUSH) 0.9 %
5-40 SYRINGE (ML) INJECTION EVERY 12 HOURS SCHEDULED
Status: CANCELLED | OUTPATIENT
Start: 2024-09-25

## 2024-09-25 RX ORDER — ALOGLIPTIN 12.5 MG/1
12.5 TABLET, FILM COATED ORAL DAILY
Status: DISCONTINUED | OUTPATIENT
Start: 2024-09-26 | End: 2024-10-03 | Stop reason: HOSPADM

## 2024-09-25 RX ORDER — ALOGLIPTIN 12.5 MG/1
12.5 TABLET, FILM COATED ORAL DAILY
Status: CANCELLED | OUTPATIENT
Start: 2024-09-25

## 2024-09-25 RX ORDER — METFORMIN HCL 500 MG
1000 TABLET, EXTENDED RELEASE 24 HR ORAL
Status: DISCONTINUED | OUTPATIENT
Start: 2024-09-26 | End: 2024-10-03 | Stop reason: HOSPADM

## 2024-09-25 RX ORDER — GLUCAGON 1 MG/ML
1 KIT INJECTION PRN
Status: DISCONTINUED | OUTPATIENT
Start: 2024-09-25 | End: 2024-10-03 | Stop reason: HOSPADM

## 2024-09-25 RX ORDER — INSULIN LISPRO 100 [IU]/ML
0-4 INJECTION, SOLUTION INTRAVENOUS; SUBCUTANEOUS NIGHTLY
Status: CANCELLED | OUTPATIENT
Start: 2024-09-25

## 2024-09-25 RX ORDER — ACETAMINOPHEN 650 MG/1
650 SUPPOSITORY RECTAL EVERY 6 HOURS PRN
Status: CANCELLED | OUTPATIENT
Start: 2024-09-25

## 2024-09-25 RX ORDER — ACETAMINOPHEN 650 MG/1
650 SUPPOSITORY RECTAL EVERY 6 HOURS PRN
Status: DISCONTINUED | OUTPATIENT
Start: 2024-09-25 | End: 2024-10-03 | Stop reason: HOSPADM

## 2024-09-25 RX ORDER — SODIUM CHLORIDE 9 MG/ML
INJECTION, SOLUTION INTRAVENOUS PRN
Status: DISCONTINUED | OUTPATIENT
Start: 2024-09-25 | End: 2024-09-30

## 2024-09-25 RX ORDER — POTASSIUM CHLORIDE 7.45 MG/ML
10 INJECTION INTRAVENOUS PRN
Status: CANCELLED | OUTPATIENT
Start: 2024-09-25

## 2024-09-25 RX ORDER — LANOLIN ALCOHOL/MO/W.PET/CERES
500 CREAM (GRAM) TOPICAL DAILY
Status: DISCONTINUED | OUTPATIENT
Start: 2024-09-26 | End: 2024-10-03 | Stop reason: HOSPADM

## 2024-09-25 RX ORDER — ENOXAPARIN SODIUM 100 MG/ML
40 INJECTION SUBCUTANEOUS DAILY
Status: DISCONTINUED | OUTPATIENT
Start: 2024-09-26 | End: 2024-10-03 | Stop reason: HOSPADM

## 2024-09-25 RX ORDER — TRAMADOL HYDROCHLORIDE 50 MG/1
50 TABLET ORAL EVERY 6 HOURS PRN
Status: DISCONTINUED | OUTPATIENT
Start: 2024-09-25 | End: 2024-10-03 | Stop reason: HOSPADM

## 2024-09-25 RX ORDER — ASCORBIC ACID 500 MG
500 TABLET ORAL DAILY
Status: DISCONTINUED | OUTPATIENT
Start: 2024-09-26 | End: 2024-10-03 | Stop reason: HOSPADM

## 2024-09-25 RX ORDER — INSULIN LISPRO 100 [IU]/ML
0-4 INJECTION, SOLUTION INTRAVENOUS; SUBCUTANEOUS
Status: CANCELLED | OUTPATIENT
Start: 2024-09-25

## 2024-09-25 RX ORDER — FAMOTIDINE 20 MG/1
20 TABLET, FILM COATED ORAL 2 TIMES DAILY
Status: DISCONTINUED | OUTPATIENT
Start: 2024-09-25 | End: 2024-10-03 | Stop reason: HOSPADM

## 2024-09-25 RX ORDER — TRAMADOL HYDROCHLORIDE 50 MG/1
50 TABLET ORAL EVERY 6 HOURS PRN
Status: CANCELLED | OUTPATIENT
Start: 2024-09-25

## 2024-09-25 RX ORDER — SODIUM CHLORIDE 9 MG/ML
INJECTION, SOLUTION INTRAVENOUS PRN
Status: CANCELLED | OUTPATIENT
Start: 2024-09-25

## 2024-09-25 RX ORDER — ACETAMINOPHEN 325 MG/1
650 TABLET ORAL EVERY 6 HOURS PRN
Status: DISCONTINUED | OUTPATIENT
Start: 2024-09-25 | End: 2024-10-03 | Stop reason: HOSPADM

## 2024-09-25 RX ORDER — METFORMIN HCL 500 MG
1000 TABLET, EXTENDED RELEASE 24 HR ORAL
Status: CANCELLED | OUTPATIENT
Start: 2024-09-25

## 2024-09-25 RX ORDER — POTASSIUM CHLORIDE 7.45 MG/ML
10 INJECTION INTRAVENOUS PRN
Status: DISCONTINUED | OUTPATIENT
Start: 2024-09-25 | End: 2024-09-30

## 2024-09-25 RX ORDER — INSULIN LISPRO 100 [IU]/ML
0-4 INJECTION, SOLUTION INTRAVENOUS; SUBCUTANEOUS NIGHTLY
Status: DISCONTINUED | OUTPATIENT
Start: 2024-09-25 | End: 2024-09-26

## 2024-09-25 RX ORDER — GLUCAGON 1 MG/ML
1 KIT INJECTION PRN
Status: CANCELLED | OUTPATIENT
Start: 2024-09-25

## 2024-09-25 RX ORDER — ASPIRIN 81 MG/1
81 TABLET, CHEWABLE ORAL
Status: CANCELLED | OUTPATIENT
Start: 2024-09-26

## 2024-09-25 RX ORDER — ONDANSETRON 4 MG/1
4 TABLET, ORALLY DISINTEGRATING ORAL EVERY 8 HOURS PRN
Status: DISCONTINUED | OUTPATIENT
Start: 2024-09-25 | End: 2024-10-03 | Stop reason: HOSPADM

## 2024-09-25 RX ORDER — ONDANSETRON 4 MG/1
4 TABLET, ORALLY DISINTEGRATING ORAL EVERY 8 HOURS PRN
Status: CANCELLED | OUTPATIENT
Start: 2024-09-25

## 2024-09-25 RX ORDER — LANOLIN ALCOHOL/MO/W.PET/CERES
500 CREAM (GRAM) TOPICAL DAILY
Status: CANCELLED | OUTPATIENT
Start: 2024-09-25

## 2024-09-25 RX ORDER — DEXTROSE MONOHYDRATE 100 MG/ML
INJECTION, SOLUTION INTRAVENOUS CONTINUOUS PRN
Status: CANCELLED | OUTPATIENT
Start: 2024-09-25

## 2024-09-25 RX ORDER — CYCLOBENZAPRINE HCL 10 MG
5 TABLET ORAL EVERY 6 HOURS PRN
Status: DISCONTINUED | OUTPATIENT
Start: 2024-09-25 | End: 2024-10-03 | Stop reason: HOSPADM

## 2024-09-25 RX ORDER — ASCORBIC ACID 500 MG
500 TABLET ORAL DAILY
Status: CANCELLED | OUTPATIENT
Start: 2024-09-25

## 2024-09-25 RX ORDER — ACETAMINOPHEN 325 MG/1
650 TABLET ORAL EVERY 6 HOURS PRN
Status: CANCELLED | OUTPATIENT
Start: 2024-09-25

## 2024-09-25 RX ORDER — POLYETHYLENE GLYCOL 3350 17 G/17G
17 POWDER, FOR SOLUTION ORAL DAILY PRN
Status: DISCONTINUED | OUTPATIENT
Start: 2024-09-25 | End: 2024-10-03 | Stop reason: HOSPADM

## 2024-09-25 RX ORDER — ONDANSETRON 2 MG/ML
4 INJECTION INTRAMUSCULAR; INTRAVENOUS EVERY 6 HOURS PRN
Status: DISCONTINUED | OUTPATIENT
Start: 2024-09-25 | End: 2024-09-30

## 2024-09-25 RX ORDER — ASPIRIN 81 MG/1
81 TABLET, CHEWABLE ORAL
Status: DISCONTINUED | OUTPATIENT
Start: 2024-09-26 | End: 2024-10-03 | Stop reason: HOSPADM

## 2024-09-25 RX ORDER — FERROUS SULFATE 325(65) MG
325 TABLET ORAL
Status: CANCELLED | OUTPATIENT
Start: 2024-09-25

## 2024-09-25 RX ORDER — SODIUM CHLORIDE 0.9 % (FLUSH) 0.9 %
5-40 SYRINGE (ML) INJECTION PRN
Status: DISCONTINUED | OUTPATIENT
Start: 2024-09-25 | End: 2024-09-30

## 2024-09-25 RX ORDER — SODIUM CHLORIDE 0.9 % (FLUSH) 0.9 %
5-40 SYRINGE (ML) INJECTION PRN
Status: CANCELLED | OUTPATIENT
Start: 2024-09-25

## 2024-09-25 RX ORDER — ENOXAPARIN SODIUM 100 MG/ML
40 INJECTION SUBCUTANEOUS DAILY
Status: CANCELLED | OUTPATIENT
Start: 2024-09-25

## 2024-09-25 RX ORDER — LISINOPRIL 5 MG/1
2.5 TABLET ORAL DAILY
Status: CANCELLED | OUTPATIENT
Start: 2024-09-25

## 2024-09-25 RX ORDER — POTASSIUM CHLORIDE 750 MG/1
40 TABLET, EXTENDED RELEASE ORAL PRN
Status: DISCONTINUED | OUTPATIENT
Start: 2024-09-25 | End: 2024-10-03 | Stop reason: HOSPADM

## 2024-09-25 RX ORDER — MAGNESIUM SULFATE IN WATER 40 MG/ML
2000 INJECTION, SOLUTION INTRAVENOUS PRN
Status: CANCELLED | OUTPATIENT
Start: 2024-09-25

## 2024-09-25 RX ORDER — FAMOTIDINE 20 MG/1
20 TABLET, FILM COATED ORAL 2 TIMES DAILY
Status: CANCELLED | OUTPATIENT
Start: 2024-09-25

## 2024-09-25 RX ORDER — POLYETHYLENE GLYCOL 3350 17 G/17G
17 POWDER, FOR SOLUTION ORAL DAILY PRN
Status: CANCELLED | OUTPATIENT
Start: 2024-09-25

## 2024-09-25 RX ADMIN — FAMOTIDINE 20 MG: 20 TABLET, FILM COATED ORAL at 20:36

## 2024-09-25 RX ADMIN — LISINOPRIL 2.5 MG: 5 TABLET ORAL at 08:15

## 2024-09-25 RX ADMIN — CEFTRIAXONE SODIUM 1000 MG: 1 INJECTION, POWDER, FOR SOLUTION INTRAMUSCULAR; INTRAVENOUS at 08:20

## 2024-09-25 RX ADMIN — SODIUM CHLORIDE, PRESERVATIVE FREE 10 ML: 5 INJECTION INTRAVENOUS at 08:15

## 2024-09-25 RX ADMIN — OXYCODONE HYDROCHLORIDE AND ACETAMINOPHEN 1 TABLET: 5; 325 TABLET ORAL at 09:19

## 2024-09-25 RX ADMIN — Medication 500 MCG: at 08:14

## 2024-09-25 RX ADMIN — OXYCODONE HYDROCHLORIDE AND ACETAMINOPHEN 500 MG: 500 TABLET ORAL at 08:15

## 2024-09-25 RX ADMIN — FAMOTIDINE 20 MG: 20 TABLET, FILM COATED ORAL at 08:14

## 2024-09-25 RX ADMIN — ALOGLIPTIN 12.5 MG: 12.5 TABLET, FILM COATED ORAL at 08:14

## 2024-09-25 RX ADMIN — SODIUM CHLORIDE, PRESERVATIVE FREE 10 ML: 5 INJECTION INTRAVENOUS at 20:43

## 2024-09-25 RX ADMIN — FERROUS SULFATE TAB 325 MG (65 MG ELEMENTAL FE) 325 MG: 325 (65 FE) TAB at 08:14

## 2024-09-25 RX ADMIN — CYCLOBENZAPRINE 5 MG: 10 TABLET, FILM COATED ORAL at 09:20

## 2024-09-25 RX ADMIN — OXYCODONE HYDROCHLORIDE AND ACETAMINOPHEN 1 TABLET: 5; 325 TABLET ORAL at 16:36

## 2024-09-25 RX ADMIN — METFORMIN HYDROCHLORIDE 1000 MG: 500 TABLET, EXTENDED RELEASE ORAL at 08:14

## 2024-09-25 RX ADMIN — CYCLOBENZAPRINE 5 MG: 10 TABLET, FILM COATED ORAL at 20:35

## 2024-09-25 RX ADMIN — ENOXAPARIN SODIUM 40 MG: 100 INJECTION SUBCUTANEOUS at 08:16

## 2024-09-25 ASSESSMENT — PAIN SCALES - GENERAL
PAINLEVEL_OUTOF10: 4
PAINLEVEL_OUTOF10: 0
PAINLEVEL_OUTOF10: 7
PAINLEVEL_OUTOF10: 3
PAINLEVEL_OUTOF10: 0
PAINLEVEL_OUTOF10: 0

## 2024-09-25 ASSESSMENT — PAIN DESCRIPTION - ORIENTATION
ORIENTATION: LEFT
ORIENTATION: LEFT

## 2024-09-25 ASSESSMENT — PAIN - FUNCTIONAL ASSESSMENT
PAIN_FUNCTIONAL_ASSESSMENT: NONE - DENIES PAIN
PAIN_FUNCTIONAL_ASSESSMENT: PREVENTS OR INTERFERES SOME ACTIVE ACTIVITIES AND ADLS
PAIN_FUNCTIONAL_ASSESSMENT: NONE - DENIES PAIN
PAIN_FUNCTIONAL_ASSESSMENT: PREVENTS OR INTERFERES SOME ACTIVE ACTIVITIES AND ADLS
PAIN_FUNCTIONAL_ASSESSMENT: NONE - DENIES PAIN

## 2024-09-25 ASSESSMENT — PAIN DESCRIPTION - LOCATION
LOCATION: HIP
LOCATION: HIP

## 2024-09-25 ASSESSMENT — PAIN DESCRIPTION - DESCRIPTORS
DESCRIPTORS: ACHING
DESCRIPTORS: BURNING;STABBING

## 2024-09-25 NOTE — PROGRESS NOTES
[facility]  Physical Therapy    Date: 2024  Patient Name: Yayo Simeon        : 1940       [] Pt Refusal           [x] Pt Unavailable due to:  Patient currently sleeping per nurse.  She would like him to rest for now. Will resume with PT services later today or tomorrow morning.        Li Osborne, PTA Date: 2024

## 2024-09-25 NOTE — PROGRESS NOTES
[facility]  Swing Bed Evaluation for Certification for Skilled Care    Yayo Simeon meets skilled criteria due to the need for skilled nursing supervision or skilled rehabilitation services listed below:   [x] Therapy; physical and occupational; for decreased strength, balance and self         care activities.   [] Tpn    [] Trach care   [] IV therapy   [] Wound care    [] Other Skilled Need:        Yayo Simeon lives [] Alone      [x] With Spouse    [] Other:   and plans on returning there at discharge.     [x] Pt declines list of alternate providers, pt prefers to receive skilled care at [facility]  [] List of alternate providers given to patient, pt prefers to receive skilled care at [facility]  [] Not applicable, pt admitted to [facility] from Outside Facility    Yayo Simeon prefers this facility for skilled care and services can only be practically provided in a skilled nursing facility or swing bed [facility] on an inpatient basis. Yayo Simeon will require skilled care on a daily basis beginning 9/25/2024, if medically stable.  These services are for an ongoing condition for which Yayo Simeon received inpatient care for at the hospital.        Val Ferguson,      Date: 09/25/2024

## 2024-09-25 NOTE — CARE COORDINATION
09/25/24 0907   IMM Letter   IMM Letter given to Patient/Family/Significant other/Guardian/POA/by: second IMM letter given to patient per Sharif Rodriguez RN   IMM Letter date given: 09/25/24   IMM Letter time given: 0845     IMM letter provided to patient.  Patient offered four hours to make informed decision regarding appeal process; patient agreeable to discharge.     Pt states he prefers to ask his wife if swing bed would be ok. He then phones his wife Tracey and he explains to her he would prefer to go home but swing bed has been offered. He then tells RN that \"she told me to use swing bed because I'm not moving that good, I just don't want to be here more than a day or two\". RN explains that his swing bed stay is determined based on his progress with PT and to make sure we have a safe discharge plan in place when he is able to return home. RN explains the weekly swing bed interdisciplinary meeting for Wed 10/2 @ 11:30am is when we will discuss his progress but if therapy feels he is meeting all his goals and is safe to discharge before that day, he could be discharged. He states \"ok then, I'll do it\".

## 2024-09-25 NOTE — ACP (ADVANCE CARE PLANNING)
Advance Care Planning Activator (Inpatient)  Conversation Note        Date of ACP Conversation: 9/23/2024 (reviewed 9/25/2024)     Conversation Conducted with: Patient with Decision Making Capacity     ACP Activator: PRECIOUS Alexander, W           Health Care Decision Maker:      Current Designated Health Care Decision Maker:     Primary Decision Maker: Nely Simeon - [facility] - [phone]  Click here to complete Healthcare Decision Makers including section of the Healthcare Decision Maker Relationship (ie \"Primary\")  Today we documented Decision Maker(s) consistent with Legal Next of Kin hierarchy.     Care Preferences     Ventilation:  \"If you were in your present state of health and suddenly became very ill and were unable to breathe on your own, what would your preference be about the use of a ventilator (breathing machine) if it were available to you?\"       Would the patient desire the use of ventilator (breathing machine)?: yes     \"If your health worsens and it becomes clear that your chance of recovery is unlikely, what would your preference be about the use of a ventilator (breathing machine) if it were available to you?\"      Would the patient desire the use of ventilator (breathing machine)?: \"Wife to make that decision\"        Resuscitation  \"CPR works best to restart the heart when there is a sudden event, like a heart attack, in someone who is otherwise healthy. Unfortunately, CPR does not typically restart the heart for people who have serious health conditions or who are very sick.\"     \"In the event your heart stopped as a result of an underlying serious health condition, would you want attempts to be made to restart your heart (answer \"yes\" for attempt to resuscitate) or would you prefer a natural death (answer \"no\" for do not attempt to resuscitate)?\" yes         [] Yes   [x] No   Educated Patient / Decision Maker regarding differences between Advance Directives and portable DNR

## 2024-09-25 NOTE — PROGRESS NOTES
Hospitalist Progress Note  9/25/2024 6:45 AM  Subjective:   Admit Date: 9/22/2024  PCP: Joshua Suarez MD    Interval History: Yayo has no complaints this morning.  He states his pain is controlled.  No chest pain or SOB.  Appetite is okay, no nausea.  He did not do well with therapy yesterday.  Discussed ECF placement vs swing bed and he states he will go into swing bed but will take one day at a time on his discharge.     Diet: ADULT ORAL NUTRITION SUPPLEMENT; Breakfast; Diabetic Oral Supplement  ADULT DIET; Regular; 3 carb choices (45 gm/meal)  Medications:   Scheduled Meds:   cefTRIAXone (ROCEPHIN) IV  1,000 mg IntraVENous Q24H    sodium chloride flush  5-40 mL IntraVENous 2 times per day    enoxaparin  40 mg SubCUTAneous Daily    ascorbic acid  500 mg Oral Daily    [Held by provider] aspirin  81 mg Oral Once per day on Sunday Tuesday Thursday Saturday    ferrous sulfate  325 mg Oral Daily with breakfast    lisinopril  2.5 mg Oral Daily    vitamin B-12  500 mcg Oral Daily    sodium chloride flush  5-40 mL IntraVENous 2 times per day    famotidine  20 mg Oral BID    insulin lispro  0-4 Units SubCUTAneous TID WC    insulin lispro  0-4 Units SubCUTAneous Nightly    metFORMIN  1,000 mg Oral Daily with breakfast    And    alogliptin  12.5 mg Oral Daily     Continuous Infusions:   sodium chloride      sodium chloride      dextrose         Patient's current medications documented, reviewed, and updated.      CBC:   Recent Labs     09/23/24  0345 09/24/24  0345 09/25/24  0315   WBC 9.2 12.2* 11.9*   HGB 11.6* 11.5* 10.1*    167 154     BMP:    Recent Labs     09/22/24  1439 09/23/24  0345 09/24/24  0345    140 138   K 4.5 5.0 4.7    105 103   CO2 28 29 27   BUN 22 18 16   CREATININE 1.1 1.0 0.9   GLUCOSE 150* 129* 104*     Hepatic:   Recent Labs     09/22/24  1439   AST 14   ALT 8   BILITOT 0.3   ALKPHOS 50     Troponin: No results for input(s): \"TROPONINI\" in the last 72 hours.  BNP: No  scale.   HTN - controlled on Lisinopril.    GERD - stable on Pepcid.    H/O iron deficiency anemia - on iron replacement.   COPD     Plan:  DC to swing bed today to continue with therapy      Differential Diagnosis:  -  Condition is improving / unchanged / worsening:  improving  Condition is at treatment goal:  No - needs swing bed  Chronic condition is / is not having mild / moderate, severe exacerbation, progression or side effects of treatment:  -    Shared decision making:  -    Code status and discussions:  Full    DVT prophylaxis:   [x] Lovenox   [x] SCDs   [] SQ Heparin   [] Encourage ambulation, low risk for DVT, no chemical or mechanical    prophylaxis necessary      [] Already on Anticoagulation    Patient Active Problem List:     Left hip pain     Hip fracture requiring operative repair, left, closed, initial encounter (HCC)        Andi Mena MD, MD  [facility] Hospitalist

## 2024-09-25 NOTE — PROGRESS NOTES
Pt has agreed to be turned only a couple times today.  Gets angry when asked if he would like to get up to the chair.  Does allow for bath to be completed, nuno care, and then repositioned onto right side.  Abductor pillow remains in place with SCD's in place. Coccyx area darkened with small open areas.  Very tender for patient.  Mepilex dressing applied after area washed.  Offered again to assist pt up to chair but he again refuses.  Visitor arrives at bedside.  Pt appreciative of care given.

## 2024-09-25 NOTE — PROGRESS NOTES
Comprehensive Nutrition Assessment    Type and Reason for Visit:  Initial, Positive Nutrition Screen ([facility] admission. MST 1)    Nutrition Recommendations/Plan:   Continue current diet.   Continue current ONS.  Encourage protein foods for healing needs.      Malnutrition Assessment:  Malnutrition Status:  At risk for malnutrition (Comment) (09/25/24 9051)    Context:  Acute Illness     Findings of the 6 clinical characteristics of malnutrition:  Energy Intake:  Mild decrease in energy intake (Comment) (PO < 75% since acute admission)  Weight Loss:  No significant weight loss     Body Fat Loss:  Mild body fat loss Fat Overlying Ribs   Muscle Mass Loss:  No significant muscle mass loss    Fluid Accumulation:  Mild Extremities   Strength:  Not Performed    Nutrition Assessment:    Increased nutrient needs r/t acute injury/trauma aeb healing needs from Left hip surgery 9/23 after fall with Fx. Glucose levels , A1c 6.0-good glycemic control with advanced age. Nurse reports Pt refused to order anything for lunch and finally took a cheeseburger, ate 3/4. Would not order anything else. Per dietary , Pt rude to her about asking him for lunch order today. Nurse reports he did drink ONS at breakfast. Will keep ONS for now. Encourage protein foods for healing needs. Pt asleep at attempted visits.    Nutrition Related Findings:    non pitting LLE edema, active bowel sounds. Wound Type: Surgical Incision       Current Nutrition Intake & Therapies:    Average Meal Intake: 51-75%  Average Supplements Intake: 0%  ADULT ORAL NUTRITION SUPPLEMENT; Breakfast; Diabetic Oral Supplement  ADULT DIET; Regular; 4 carb choices (60 gm/meal)    Anthropometric Measures:  Height: 185.4 cm (6' 1\")  Ideal Body Weight (IBW): 184 lbs (84 kg)    Admission Body Weight: 81.4 kg (179 lb 7 oz) (acute admission)  Current Body Weight: 81.2 kg (179 lb 0.2 oz), 97.3 % IBW. Weight Source: Bed Scale  Current BMI (kg/m2): 23.6  Usual Body Weight:  84.8 kg (187 lb)  % Weight Change (Calculated): -4.3  Weight Adjustment For: No Adjustment                 BMI Categories: Normal Weight (BMI 22.0 to 24.9) age over 65    Estimated Daily Nutrient Needs:  Energy Requirements Based On: Kcal/kg  Weight Used for Energy Requirements: Current  Energy (kcal/day): 3825-4669 (25-28/kg)  Weight Used for Protein Requirements: Current  Protein (g/day): 106-122g (1.3-1.5g/kg)  Method Used for Fluid Requirements: 1 ml/kcal  Fluid (ml/day): 2,200 ml  Hematology:  Recent Labs     09/23/24  0345 09/24/24  0345 09/25/24  0315   WBC 9.2 12.2* 11.9*   HGB 11.6* 11.5* 10.1*   HCT 35.0* 34.0* 29.7*     Chemistry:  Recent Labs     09/22/24  1439 09/23/24  0345 09/24/24  0345    140 138   K 4.5 5.0 4.7    105 103   CO2 28 29 27   GLUCOSE 150* 129* 104*   BUN 22 18 16   CREATININE 1.1 1.0 0.9   CALCIUM 9.6 9.1 8.9     Recent Labs     09/22/24  1439   LABA1C 6.0   AST 14   ALT 8   ALKPHOS 50   BILITOT 0.3       Lab Results   Component Value Date/Time    LABA1C 6.0 09/22/2024 02:39 PM      Recent Labs     09/23/24  2247 09/24/24  0158 09/24/24  0724 09/24/24  1117 09/24/24  1631 09/24/24 2027 09/25/24  0745 09/25/24  1115   POCGLU 85 106* 109* 149* 167* 157* 119* 144*    No results found for: \"TRIG\", \"HDL\", \"LDLDIRECT\"   No results found for: \"VITD25\"    Nutrition Diagnosis:   Increased nutrient needs related to acute injury/trauma as evidenced by other (comment) (healing from hip surgery)    Nutrition Interventions:   Food and/or Nutrient Delivery: Continue Current Diet, Continue Oral Nutrition Supplement  Nutrition Education/Counseling: No recommendation at this time  Coordination of Nutrition Care: Continue to monitor while inpatient  Plan of Care discussed with: nurse, dietary     Goals:     Goals: Meet at least 75% of estimated needs       Nutrition Monitoring and Evaluation:      Food/Nutrient Intake Outcomes: Food and Nutrient Intake, Supplement Intake  Physical

## 2024-09-25 NOTE — PLAN OF CARE
Problem: Discharge Planning  Goal: Discharge to home or other facility with appropriate resources  Outcome: Progressing     Problem: Pain  Goal: Verbalizes/displays adequate comfort level or baseline comfort level  Outcome: Progressing     Problem: Safety - Adult  Goal: Free from fall injury  Outcome: Progressing     Problem: Skin/Tissue Integrity  Goal: Absence of new skin breakdown  Description: 1.  Monitor for areas of redness and/or skin breakdown  2.  Assess vascular access sites hourly  3.  Every 4-6 hours minimum:  Change oxygen saturation probe site  4.  Every 4-6 hours:  If on nasal continuous positive airway pressure, respiratory therapy assess nares and determine need for appliance change or resting period.  Outcome: Progressing     Problem: ABCDS Injury Assessment  Goal: Absence of physical injury  Outcome: Progressing     Problem: Chronic Conditions and Co-morbidities  Goal: Patient's chronic conditions and co-morbidity symptoms are monitored and maintained or improved  Outcome: Progressing     Problem: Nutrition Deficit:  Goal: Optimize nutritional status  Outcome: Progressing     Problem: Cardiovascular - Adult  Goal: Maintains optimal cardiac output and hemodynamic stability  Outcome: Progressing  Goal: Absence of cardiac dysrhythmias or at baseline  Outcome: Progressing     Problem: Skin/Tissue Integrity - Adult  Goal: Skin integrity remains intact  Outcome: Progressing  Goal: Incisions, wounds, or drain sites healing without S/S of infection  Outcome: Progressing  Goal: Oral mucous membranes remain intact  Outcome: Progressing     Problem: Musculoskeletal - Adult  Goal: Return mobility to safest level of function  Outcome: Progressing  Goal: Maintain proper alignment of affected body part  Outcome: Progressing  Goal: Return ADL status to a safe level of function  Outcome: Progressing     Problem: Infection - Adult  Goal: Absence of infection at discharge  Outcome: Progressing  Goal: Absence of

## 2024-09-25 NOTE — PROGRESS NOTES
Refuses to order lunch from dietary.  Dietary personnel tells this nurse pt states \"If you don't get out of here soon there is going to be problems!\".  RN in to speak with pt who reiterates he does not want to order lunch.  Explained need for adequate nutrition in order to heal \"I know all that\".  States that he did not want to order lunch because his coffee was cold this morning and his soup was cold yesterday.  Encouraged to let us know if something is not right with his meal trays.  Also offered a fresh cup of coffee but refuses.  Encouraged to continue to take in fluids.

## 2024-09-25 NOTE — DISCHARGE SUMMARY
Hospitalist Discharge Summary    Yayo Simeon  :  1940  MRN:  [id number]    Admit date:  2024  Discharge date:  24    Admitting Physician:  Delfin Ji MD    Discharge Diagnoses:    Acute subcapital fracture of the left femoral neck.  Dr. Whipple in Ortho consulted and left hip hemiarthroplasty performed on .   UTI  Mobitz type 1 second degree block.  Chronic urinary retention - nuno cath changed during admission.  DM II - controlled.  GERD  Iron deficiency anemia  COPD    Admission Condition:  Poor      Discharged Condition:  fair    Hospital Course:     The patient is a 84 y.o. male who presents to the ER with left hip pain.  Yayo, who has a history of DM II, HTN, and GERD, was picking sticks up on his property.  He states he stopped his golf cart on a hill and set the brake.  After getting out the cart started moving forward so  he grabbed it to attempt to stop it.  Unfortunately, he was thrown to the ground with immediate left hip pain.  After yelling or 20 minutes his wife and daughter finally heard him.  He denies hitting his head or having any LOC.  He states he has been feeling well recently but did have a runny nose last week.  Yayo does have a chronic nuno secondary to urinary retention and follows with Dr. Prater.  Yayo denies a history of heart problems and denies having episodes of chest pain.       In the ER his CMP was normal other than a glucose of 150.  CBC was normal other than a Hgb of 12.  HgbA1C was 6.  INR was 1.0 with a PTT of 28.7.  UA showed 2 + LE with 10 to 20 WBC.       CXR showed:  IMPRESSION:     1. Chronic obstructive pulmonary disease  2. Small right pleural effusion or thickening  3. Suspect multisegmental areas of pleural-parenchymal scarring throughout the   right mid chest and left upper chest.  4. Convincing alveolar airspace disease or pneumothorax are not evident.     Hip xray showed:  1. Diffuse osteopenia. Acute subcapital fracture of the

## 2024-09-25 NOTE — PROGRESS NOTES
[facility]  Phone: [phone]    Occupational Therapy Skilled Daily Note  Date: 2024  Patient Name: Yayo Simeon        MRN: [id number]    : 1940  (84 y.o.)    Subjective:     Subjective: Patient was lying supine in bed upon arrival. Was agreeable to therapy interventions.    Pt shows NO CHANGE toward goals and independence of Self Care this treatment session    Discharge recommendation: Continue to assess Pending Progress    Objective:       Transfers:  Supine to Sit: Moderate assistance, 2 Person assistance  Sit to Supine: Moderate assistance, Maximum assistance, 2 Person assistance  Sit to stand: Moderate assistance, 2 Person assistance (x2-3)   Stand to sit: Moderate assistance, 2 Person assistance  Toilet Transfer: Moderate assistance, 2 Person assistance    Assessment:     Assessment: Patient supine in bed upon arrival, agreeable to therapy interventions. Co-tx with PTA is completed d/t safety concerns as patient continues to require 2+ assistance to complete bed mobility and fxnl transfers d/t pain (even following administration of pain medications). Completes bed mobility and fxnl transfers as noted above. Posterior lean is present during transfers, patient unable to correct. Transfers to [facility] with x2 assist. Reports \"It doesn't feel any easier than yesterday\". Support is provided. Remains in bed upon ROLANDO departure with call light and adduction pillow in place. Bed alarm in place, will continue to address goals and progress patient as able/tolerated.    Exercises:     See Flowsheets    Goals:     Short term goals:  Time Frame for Short Term Goals: 4 days (24)  Short Term Goal 1: Patient will complete a commode transfer while using DME PRN and adhering to hip precautions with MIN A x1.  Short Term Goal 2: Patient will complete upper body dressing and/or bathing while using adaptive equipment/techniques PRN and adhering to hip precautions with SUP/SETUP.  Short Term Goal 3:  Patient will complete lower body dressing and/or bathing while using adaptive equipment/techniques PRN and adhering to hip precautions with MIN A.  Short Term Goal 4: To increase activity tolerance for ADLs, patient will engage in 10 minutes of BUE ther ex/ther act while adhering to hip precautions without rest breaks.  Short Term Goal 5: To increase independence and safety with IADLs, patient will tolerate static/dynamic standing x5 minutes while adhering to hip precautions without LOB.    Long term goals:  STGs=LTGs         Call light in reach, Phone in reach, Use of Gait belt, Bed alarm, Nurse Notified, and Left in bed  Time In: 0929   Time Out: 1005  Timed Coded Minutes: 18 (co tx with PTA)  Total Treatment Time: 36    IBAN Salter/ALICIA      Date: 9/25/2024

## 2024-09-25 NOTE — PLAN OF CARE
while adhering to hip precautions without rest breaks.  Short Term Goal 5: To increase independence and safety with IADLs, patient will tolerate static/dynamic standing x5 minutes while adhering to hip precautions without LOB.    Long term goals:  STGs=LTGs         Madison Juarez OTR/ALICIA                      Date: 9/25/2024

## 2024-09-25 NOTE — PROGRESS NOTES
[facility] due to generalized weakness and s/p left hip repair. Swing bed packet provided to pt and spouse and reviewed contents with them. Discussed IDT meetings on Wednesdays to discuss progress. Pt and spouse express understanding and spouse signs acknowledgement of receipt and this is placed in paper chart.     Pt's swing bed assessment remains as follows:     Pt is alert and oriented and cooperative with assessment. Pt is a 84 year old  male [facility] due to  left hip fracture and repair. Pt lives with his spouse and daughter in their home in [place]. Pt has a cane and grab bars to use at home. Pt was not using any services prior to admission. Pt and spouse both drive and can get to appointments.      Pt is a full code and follows with Dr Jesús Suarez as PCP. Pt does not have advance directives on file but reports that his spouse would be his decision maker if needed. ACP note reviewed/completed with pt. Pt reports that his medications have been affordable so far.     Pt not discussed at IDT team meeting this date due to just being put in swing bed status. Plan to evaluate pt progress at swing bed meeting on Wednesday 10/2/2024. SW following. Tania Sexton MSW LSW 9/25/2024

## 2024-09-25 NOTE — PROGRESS NOTES
Phone: [phone] [facility]  Date: 2024  Fax: [phone]      Physical Therapy    Daily Note    Patient Name: Yayo Simeon      : 1940  (84 y.o.)  MRN: [id number]     Pt shows NO CHANGE toward goals and increased independence of mobility this treatment session        Assessment           Supine to Sit: Moderate assistance (x2-3)  Sit to Supine: Moderate assistance, Maximal assistance (x2)       Sit to Stand: Moderate Assistance (x2-3)  Stand to Sit: Moderate Assistance (x2)  Bed to Chair: Moderate assistance (x2)                              Assessment: Co-treat with ROLANDO due to safety concerns and to time with pain meds.  Patient is in bed upon arrival to room. Supine to sit is mod x 2-3 and requires extended period of time to complete per patient.  Sitting balance at edge of bed is fair. Height of bed is raised slightly prior to standing up.  Sit to stand from bed is mod x 2.  Completes stand pivot to bedside commode with wheeled walker and mod x 2.  Sits on comode for short period of time and then declines sitting up in chair. Sit to stand from commode is mod x 2.  Demonstrates retro lean and he tells PTA to stop pulling him forward.  Completes stand pivot transfer to bed with mod/max x 2.  Sit to supine is max x 2-3.  In bed following with call light in reach and bed alarm turned on.  Safety Devices  Type of Devices: Bed alarm in place, Call light within reach, Gait belt, Left in bed, Nurse notified          Call light in reach, Phone in reach, Use of Gait belt, Bed alarm, Nurse Notified, Other Staff Present  , and Left in bed  Time In: 929  Time Out: 1005  Timed Coded Minutes: 18 (co-treat with ROLANDO)  Total Treatment Time: 36       Exercises:  See Flowsheets    Plan  Cont Per Plan Of Care    Goals  Short Term Goals  Time Frame for Short Term Goals: 5 days - expires 24  Short Term Goal 1: Transfer supine to sit with min/mod assist x 1  Short Term Goal 2: Transfer bed to chair

## 2024-09-25 NOTE — PROGRESS NOTES
Pts nephben Smith calls in for update. OK per pt to update Luis and add into chart for updates. Update provided and all questions answered to the best of my ability. Luis Bridges [phone] added into pts contact chart.

## 2024-09-25 NOTE — PROGRESS NOTES
2030- Pt with reports of 5 out of 10 left hip pain as well as muscle spasms. Medications administered per orders as well as PRN Dilaudid and PRN Flexeril. Pt tolerated well. Ice remains applied to left hip.     2100- Pt denies all pain rating it a 0 out of 10. Patient laying semi-fowlers watching TV in bed. No further needs at this time.

## 2024-09-26 LAB
ERYTHROCYTE [DISTWIDTH] IN BLOOD BY AUTOMATED COUNT: 13.4 % (ref 12.1–15.2)
GLUCOSE BLD-MCNC: 132 MG/DL (ref 65–99)
GLUCOSE BLD-MCNC: 133 MG/DL (ref 65–99)
HCT VFR BLD AUTO: 28.4 % (ref 41–53)
HGB BLD-MCNC: 9.6 G/DL (ref 13.5–17.5)
MCH RBC QN AUTO: 30.6 PG (ref 26–34)
MCHC RBC AUTO-ENTMCNC: 33.8 G/DL (ref 31–37)
MCV RBC AUTO: 90.4 FL (ref 80–100)
PLATELET # BLD AUTO: 160 K/UL (ref 140–450)
PMV BLD AUTO: 10.7 FL (ref 6–12)
RBC # BLD AUTO: 3.14 M/UL (ref 4.5–5.9)
WBC OTHER # BLD: 11.2 K/UL (ref 3.5–11)

## 2024-09-26 PROCEDURE — 97530 THERAPEUTIC ACTIVITIES: CPT

## 2024-09-26 PROCEDURE — 6360000002 HC RX W HCPCS: Performed by: INTERNAL MEDICINE

## 2024-09-26 PROCEDURE — 97110 THERAPEUTIC EXERCISES: CPT

## 2024-09-26 PROCEDURE — 94761 N-INVAS EAR/PLS OXIMETRY MLT: CPT

## 2024-09-26 PROCEDURE — 2580000003 HC RX 258: Performed by: INTERNAL MEDICINE

## 2024-09-26 PROCEDURE — 36415 COLL VENOUS BLD VENIPUNCTURE: CPT

## 2024-09-26 PROCEDURE — 85027 COMPLETE CBC AUTOMATED: CPT

## 2024-09-26 PROCEDURE — 6370000000 HC RX 637 (ALT 250 FOR IP): Performed by: INTERNAL MEDICINE

## 2024-09-26 PROCEDURE — 82947 ASSAY GLUCOSE BLOOD QUANT: CPT

## 2024-09-26 PROCEDURE — 1200000002 HC SEMI PRIVATE SWING BED

## 2024-09-26 RX ORDER — DOCUSATE SODIUM 100 MG/1
100 CAPSULE, LIQUID FILLED ORAL 2 TIMES DAILY
Status: DISCONTINUED | OUTPATIENT
Start: 2024-09-26 | End: 2024-09-27

## 2024-09-26 RX ADMIN — DOCUSATE SODIUM 100 MG: 100 CAPSULE, LIQUID FILLED ORAL at 08:32

## 2024-09-26 RX ADMIN — SODIUM CHLORIDE, PRESERVATIVE FREE 10 ML: 5 INJECTION INTRAVENOUS at 08:31

## 2024-09-26 RX ADMIN — FERROUS SULFATE TAB 325 MG (65 MG ELEMENTAL FE) 325 MG: 325 (65 FE) TAB at 08:33

## 2024-09-26 RX ADMIN — OXYCODONE HYDROCHLORIDE AND ACETAMINOPHEN 1 TABLET: 5; 325 TABLET ORAL at 08:31

## 2024-09-26 RX ADMIN — LISINOPRIL 2.5 MG: 5 TABLET ORAL at 08:32

## 2024-09-26 RX ADMIN — ASPIRIN 81 MG 81 MG: 81 TABLET ORAL at 08:32

## 2024-09-26 RX ADMIN — OXYCODONE HYDROCHLORIDE AND ACETAMINOPHEN 500 MG: 500 TABLET ORAL at 08:33

## 2024-09-26 RX ADMIN — ENOXAPARIN SODIUM 40 MG: 100 INJECTION SUBCUTANEOUS at 08:31

## 2024-09-26 RX ADMIN — FAMOTIDINE 20 MG: 20 TABLET, FILM COATED ORAL at 08:33

## 2024-09-26 RX ADMIN — FAMOTIDINE 20 MG: 20 TABLET, FILM COATED ORAL at 20:48

## 2024-09-26 RX ADMIN — Medication 500 MCG: at 08:32

## 2024-09-26 RX ADMIN — OXYCODONE HYDROCHLORIDE AND ACETAMINOPHEN 1 TABLET: 5; 325 TABLET ORAL at 20:48

## 2024-09-26 RX ADMIN — CEFTRIAXONE SODIUM 1000 MG: 1 INJECTION, POWDER, FOR SOLUTION INTRAMUSCULAR; INTRAVENOUS at 08:40

## 2024-09-26 RX ADMIN — SODIUM CHLORIDE, PRESERVATIVE FREE 10 ML: 5 INJECTION INTRAVENOUS at 20:49

## 2024-09-26 RX ADMIN — ALOGLIPTIN 12.5 MG: 12.5 TABLET, FILM COATED ORAL at 08:32

## 2024-09-26 RX ADMIN — METFORMIN HYDROCHLORIDE 1000 MG: 500 TABLET, EXTENDED RELEASE ORAL at 08:32

## 2024-09-26 RX ADMIN — SODIUM CHLORIDE, PRESERVATIVE FREE 10 ML: 5 INJECTION INTRAVENOUS at 08:35

## 2024-09-26 ASSESSMENT — PAIN - FUNCTIONAL ASSESSMENT
PAIN_FUNCTIONAL_ASSESSMENT: PREVENTS OR INTERFERES WITH MANY ACTIVE NOT PASSIVE ACTIVITIES
PAIN_FUNCTIONAL_ASSESSMENT: 0-10
PAIN_FUNCTIONAL_ASSESSMENT: 0-10
PAIN_FUNCTIONAL_ASSESSMENT: NONE - DENIES PAIN

## 2024-09-26 ASSESSMENT — PAIN DESCRIPTION - LOCATION
LOCATION: HIP
LOCATION: HAND

## 2024-09-26 ASSESSMENT — PAIN DESCRIPTION - DESCRIPTORS
DESCRIPTORS: SHOOTING;OTHER (COMMENT)
DESCRIPTORS: SPASM;SHARP

## 2024-09-26 ASSESSMENT — PAIN DESCRIPTION - ORIENTATION
ORIENTATION: LEFT
ORIENTATION: LEFT

## 2024-09-26 ASSESSMENT — PAIN SCALES - GENERAL
PAINLEVEL_OUTOF10: 0
PAINLEVEL_OUTOF10: 8

## 2024-09-26 NOTE — PROGRESS NOTES
Agreeable to try to get up to chair for supper.  Up to chair with much encouragement and x2 assist with walker.  Does stay up in chair for 1-1/2 hours and then assisted to return to bed.  Kept off bottom and turned to left.  Tolerates fairly well.

## 2024-09-26 NOTE — PROGRESS NOTES
[facility]  Physical Therapy    Date: 2024  Patient Name: Yayo Simeon        : 1940       [] Pt Refusal           [x] Pt Unavailable due to:  Patient up in chair per nursing preparing to eat breakfast.  Advised that PTA would be back later after he had eaten and received pain meds to work on B LE strengthening.  Patient gives PTA a look like he does not want her to return. He remains up in chair to eat breakfast.        Li Osborne, PTA Date: 2024

## 2024-09-26 NOTE — PROGRESS NOTES
SWINGBED PATIENT ACTIVITY PROGRAM ASSESSMENT    Patient Name: Yayo Simeon  : 1940   Gender: male   Diagnosis:  Fracture of unspecified part of neck of left femur, initial encounter for closed fracture [S72.002A]  Generalized weakness [R53.1] MRN: [id number]     Ability to read or write? [x] Yes   [] No  Ability to hear?   [x] Yes   [] No  Is patient confused?  [] Yes   [x] No    Enter Codes in Boxes Coding:  Very Important  Somewhat important  Not very important  Not important at all  Important but can’t do or no choice  No response or non-responsive   1 A. How important is it to you to have books, newspapers, and magazines to read?   2 B.  How important is it to you to listen to music you like?   2 C.  How important is it to you to be around animals such as pets?   1 D.  How important is it to you to keep up with the news?   2 E.  How important is it to you to do things with groups of people?   1 F.  How important is it to you to do your favorite activities? Outdoors, Golf, TV   1 G.  How important is it to you to go outside to get fresh air when the weather is good?   1 H.  How important is it to you to participate in Faith services or practices?     Activity Care Plan:  Will participate in activity programs of choice daily with no decline throughout [facility] stay.          EVALUATOR’S SIGNATURE:  Val Ferguson  Date: 2024

## 2024-09-26 NOTE — PROGRESS NOTES
Phone: [phone] [facility]  Date: 2024  Fax: [phone]      Physical Therapy    Daily Note    Patient Name: Yayo Simeon      : 1940  (84 y.o.)  MRN: [id number]     Pt is PROGRESSING toward goals and increased independence of mobility this treatment session        Assessment          Supine to Sit: Moderate assistance (x2-3)  Sit to Supine: Moderate assistance, Maximal assistance (x2)       Sit to Stand: Moderate Assistance (x2)  Stand to Sit: Moderate Assistance (x2)  Bed to Chair: Moderate assistance (x2)             WB Status: transfers from chair to bed with wheeled walker and mod x 2 ~4-5 ft                Assessment: Patient in chair upon arrival to room.  He is ready to go back to bed.  Sit to stand from chair is mod x 2.  Requires cueing for proper hand placement.  Also requires mod x 2 to complete sit to stand.  Able to utilize wheeled walker and mod x 2 back to bed ~4-5 ft.  Sit to supine is mod x 2. Once in bed he tries to complete ankle pumps but notes increase in pain and requests to rest.  In bed following with call light in reach and bed alarm turned on.  Continue to progress as patient tolerates.  Safety Devices  Type of Devices: Bed alarm in place, Call light within reach, Gait belt, Left in bed, Nurse notified          Call light in reach, Phone in reach, Use of Gait belt, Bed alarm, Nurse Notified, Other Staff Present  , and Left in bed  Time In: 0938  Time Out: 0950  Timed Coded Minutes: 12  Total Treatment Time: 12       Exercises:  See Flowsheets    Plan  Cont Per Plan Of Care    Goals  Short Term Goals  Time Frame for Short Term Goals: 5 days - expires 24  Short Term Goal 1: Transfer supine to sit with min/mod assist x 1  Short Term Goal 2: Transfer bed to chair using wh walker and min/mod assist x 1  Short Term Goal 3: Sit at bedside with good balance to complete self-care tasks  Short Term Goal 4: Recall of hip precautions       Long Term Goals  Time

## 2024-09-26 NOTE — PROGRESS NOTES
Pt did not eat his breakfast tray; was willing to keep his nutritional supplement but states he just doesn't have the desire/want to eat at this time. Pt's bowels moved today and he was up x2 heavy assist with walker to [facility], has a large hard and loose BM. Pt needs much encouragement to sit up in chair for breakfast; assisted x2 heavy assist with walker to recliner. Pt educated on importance of sitting up in chair, position changes, and offloading to prevent pneumonia and pressure injuries; pt verbalizes understanding but is still adamant he does not want to be repositioned. Call light and bedside table within reach.

## 2024-09-26 NOTE — PROGRESS NOTES
eSWING BED ORIENTATION    Patient Name: Yayo Simeon  : 1940   Gender: male   Diagnosis:  Fracture of unspecified part of neck of left femur, initial encounter for closed fracture [S72.002A]  Generalized weakness [R53.1] MRN: [id number]     [x] Goal is to provide you with very good care    [x] Insurance and Financial Responsibilities    [x] Changes to Expect           - Safely encourage you to learn to care for yourself     - Frequency of Doctor's Visits       - Family Training Likely  [x] Visiting Hours:           11:00am - 8:30 pm (or by special arrangement)  [x] Personal Phone Number          Located on Dry-Erase Board   [x] Swing Bed Team Meetings         Family encouraged to attend   [x]  Clothing            Bring what you normally wear  [x]  Prevention of Falls           Put call light on, and wait until OK'd to get up on your own.  [x] Therapy Expectations          Do your best to participate  [x]  Advanced Directives                          []  Pt has advanced directives and we have a copy on file                            []  Pt has advanced directives and we do not have a copy on file,  notified and will follow up.                             [x]   Pt does not have advanced directives.  [x] Educated on mail policy           Address provided for direct to hospital service                                                                                                                                                                                                                                                                                       Orientation Completed and agreed upon with Yayo Simeon and/or Family Members

## 2024-09-26 NOTE — PLAN OF CARE
Problem: Discharge Planning  Goal: Discharge to home or other facility with appropriate resources  9/26/2024 0848 by Sirisha Scales RN  Outcome: Progressing  9/26/2024 0115 by Collette Mckinnon RN  Outcome: Progressing     Problem: Safety - Adult  Goal: Free from fall injury  9/26/2024 0848 by Sirisha Scales RN  Outcome: Progressing  9/26/2024 0115 by Collette Mckinnon RN  Outcome: Progressing     Problem: ABCDS Injury Assessment  Goal: Absence of physical injury  9/26/2024 0848 by Sirisha Scales RN  Outcome: Progressing  9/26/2024 0115 by Collette Mckinnon RN  Outcome: Progressing     Problem: Skin/Tissue Integrity  Goal: Absence of new skin breakdown  Description: 1.  Monitor for areas of redness and/or skin breakdown  2.  Assess vascular access sites hourly  3.  Every 4-6 hours minimum:  Change oxygen saturation probe site  4.  Every 4-6 hours:  If on nasal continuous positive airway pressure, respiratory therapy assess nares and determine need for appliance change or resting period.  9/26/2024 0848 by Sirisha Scales RN  Outcome: Progressing  9/26/2024 0115 by Collette Mckinnon RN  Outcome: Progressing     Problem: Neurosensory - Adult  Goal: Achieves stable or improved neurological status  9/26/2024 0848 by Sirisha Scales RN  Outcome: Progressing  9/26/2024 0115 by Collette Mckinnon RN  Outcome: Progressing     Problem: Respiratory - Adult  Goal: Achieves optimal ventilation and oxygenation  9/26/2024 0848 by Sirisha Scales RN  Outcome: Progressing  9/26/2024 0115 by Collette Mckinnon RN  Outcome: Progressing     Problem: Cardiovascular - Adult  Goal: Maintains optimal cardiac output and hemodynamic stability  9/26/2024 0848 by Sirisha Scales RN  Outcome: Progressing  9/26/2024 0115 by Collette Mckinnon RN  Outcome: Progressing     Problem: Skin/Tissue Integrity - Adult  Goal: Incisions, wounds, or drain sites healing without S/S of infection  9/26/2024 0848 by Sirisha Scales  RN  Outcome: Progressing  9/26/2024 0115 by Collette Mckinnon RN  Outcome: Progressing     Problem: Musculoskeletal - Adult  Goal: Return mobility to safest level of function  9/26/2024 0848 by Sirisha Scales RN  Outcome: Progressing  9/26/2024 0115 by Collette Mckinnon RN  Outcome: Progressing  Goal: Maintain proper alignment of affected body part  9/26/2024 0848 by Sirisha Scales RN  Outcome: Progressing  9/26/2024 0115 by Collette Mckinnon RN  Outcome: Progressing  Goal: Return ADL status to a safe level of function  9/26/2024 0848 by Sirisha Scales RN  Outcome: Progressing  9/26/2024 0115 by Collette Mckinnon RN  Outcome: Progressing     Problem: Gastrointestinal - Adult  Goal: Minimal or absence of nausea and vomiting  9/26/2024 0848 by Sirisha Scales RN  Outcome: Progressing  9/26/2024 0115 by Collette Mckinnon RN  Outcome: Progressing     Problem: Genitourinary - Adult  Goal: Urinary catheter remains patent  9/26/2024 0848 by Sirisha Scales RN  Outcome: Progressing  9/26/2024 0115 by Clolette Mckinnon RN  Outcome: Progressing     Problem: Infection - Adult  Goal: Absence of infection at discharge  9/26/2024 0848 by Sirisha Scales RN  Outcome: Progressing  9/26/2024 0115 by Collette Mckinnon RN  Outcome: Progressing     Problem: Metabolic/Fluid and Electrolytes - Adult  Goal: Electrolytes maintained within normal limits  9/26/2024 0848 by Sirisha Scales RN  Outcome: Progressing  9/26/2024 0115 by Collette Mckinnon RN  Outcome: Progressing  Goal: Glucose maintained within prescribed range  9/26/2024 0848 by Sirisha Scales RN  Outcome: Progressing  9/26/2024 0115 by Collette Mckinnon RN  Outcome: Progressing     Problem: Hematologic - Adult  Goal: Maintains hematologic stability  9/26/2024 0848 by Sirisha Scales RN  Outcome: Progressing  9/26/2024 0115 by Collette Mckinnon RN  Outcome: Progressing     Problem: Pain  Goal: Verbalizes/displays adequate comfort level or baseline comfort

## 2024-09-26 NOTE — PROGRESS NOTES
Pt refuses to be repositioned this shift. This nurse has attempted several times with several different approaches. Pt continues to refuse and states, \"I am fine where I am.\" Pt has declined pain. Pillows remains under left side. This nurse educates patient on importance of repositioning to prevent skin breakdown. Pt refuses to allow this nurse to move pillows to opposite side. Abductor pillow remains in place with SCDs in place. No further needs from patient.

## 2024-09-26 NOTE — H&P
Hospital Medicine  History and Physical    Patient:  Yayo Simeon  MRN: [id number]    CHIEF COMPLAINT:  Weakness    History Obtained From:  patient  PCP: Joshua Suarez MD    HISTORY OF PRESENT ILLNESS:    The patient is a 84 y.o. male who presents to the ER with left hip pain.  Yayo, who has a history of DM II, HTN, and GERD, was picking sticks up on his property.  He states he stopped his golf cart on a hill and set the brake.  After getting out the cart started moving forward so  he grabbed it to attempt to stop it.  Unfortunately, he was thrown to the ground with immediate left hip pain.  After yelling or 20 minutes his wife and daughter finally heard him.  He denies hitting his head or having any LOC.  He states he has been feeling well recently but did have a runny nose last week.  Yayo does have a chronic nuno secondary to urinary retention and follows with Dr. Prater.  Yayo denies a history of heart problems and denies having episodes of chest pain.       In the ER his CMP was normal other than a glucose of 150.  CBC was normal other than a Hgb of 12.  HgbA1C was 6.  INR was 1.0 with a PTT of 28.7.  UA showed 2 + LE with 10 to 20 WBC.       CXR showed:  IMPRESSION:     1. Chronic obstructive pulmonary disease  2. Small right pleural effusion or thickening  3. Suspect multisegmental areas of pleural-parenchymal scarring throughout the   right mid chest and left upper chest.  4. Convincing alveolar airspace disease or pneumothorax are not evident.     Hip xray showed:  1. Diffuse osteopenia. Acute subcapital fracture of the left femoral neck is   noted, with approximately 1 cm of superior displacement/overriding and 0.5 cm   of lateral displacement, and mild varus angulation of the distal fracture   fragment. No hip dislocation.  2. 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  3. Multilevel degenerative changes of lumbar

## 2024-09-26 NOTE — PLAN OF CARE
[facility] Physical Therapy  Plan of Care  Date: 2024  Patient Name: Yayo Simeon        : 1940  (84 y.o.)     Admission Date: 2024           PT Orders Received and Evaluation Complete  Identified Problem Areas:  Assessment: Patient transferred to [facility] for continued skilled therapy.  Patient requires 2 assist for basic transfers and would not be safe to return home at present status.  Progress with strengthening and functional mobility    Justification for Skilled Services:  [x] Reduce Falls   [x] Improve Ambulation  [x]  Complete Daily Tasks Safely   [x] Improve Balance   [x] Improve LE strength  []  Return to Prior Level of Function  [x] Improve Functional Mobility   []  Family/Caregiver Education  [x] Patient Education: [x]Assistive Devices [x]Home Exercise Program and Progression    Plan  Frequency: 1-2x/day Daily M-F, 1x/day Sat-Sun    Duration: 14 days  [] Modalities:  [x] Therapeutic Exercise   [x] Gait Training  [x] Therapeutic Activity    [] Home Safety Evaluation         [] Massage                        [] Neuromuscular Re-education [] Back Education             [x] Patient Education [] Home Exercise Program       Rehab Potential:  []  Good [x] Fair   []  Poor    Goals  Short Term Goals  Time Frame for Short Term Goals: 5 days - expires 24  Short Term Goal 1: Transfer supine to sit with min/mod assist x 1  Short Term Goal 2: Transfer bed to chair using wh walker and min/mod assist x 1  Short Term Goal 3: Sit at bedside with good balance to complete self-care tasks  Short Term Goal 4: Recall of hip precautions    Long Term Goals  Time Frame for Long Term Goals : 14 days - expires 10/9/24  Long Term Goal 1: Complete basic transfers in/OOB and chair/commode with supervision to safely return home  Long Term Goal 2: Ambulate with wh walker 50-75 ft with supervision to safely negotiate home environment  Long Term Goal 3: Good standing balance to complete  self-car tasks  Long Term Goal 4: Up/down steps with HR and CGA    EDWARD DONIS, PT,    Date: 9/26/2024

## 2024-09-26 NOTE — PLAN OF CARE
Problem: Discharge Planning  Goal: Discharge to home or other facility with appropriate resources  Outcome: Progressing     Problem: Safety - Adult  Goal: Free from fall injury  9/26/2024 0115 by Collette Mckinnon, RN  Outcome: Progressing  9/25/2024 1131 by Iona Walker RN  Outcome: Progressing     Problem: ABCDS Injury Assessment  Goal: Absence of physical injury  Outcome: Progressing     Problem: Skin/Tissue Integrity  Goal: Absence of new skin breakdown  Description: 1.  Monitor for areas of redness and/or skin breakdown  2.  Assess vascular access sites hourly  3.  Every 4-6 hours minimum:  Change oxygen saturation probe site  4.  Every 4-6 hours:  If on nasal continuous positive airway pressure, respiratory therapy assess nares and determine need for appliance change or resting period.  Outcome: Progressing     Problem: Neurosensory - Adult  Goal: Achieves stable or improved neurological status  Outcome: Progressing     Problem: Respiratory - Adult  Goal: Achieves optimal ventilation and oxygenation  Outcome: Progressing     Problem: Cardiovascular - Adult  Goal: Maintains optimal cardiac output and hemodynamic stability  Outcome: Progressing     Problem: Skin/Tissue Integrity - Adult  Goal: Incisions, wounds, or drain sites healing without S/S of infection  Outcome: Progressing     Problem: Musculoskeletal - Adult  Goal: Return mobility to safest level of function  Outcome: Progressing  Goal: Maintain proper alignment of affected body part  Outcome: Progressing  Goal: Return ADL status to a safe level of function  Outcome: Progressing     Problem: Gastrointestinal - Adult  Goal: Minimal or absence of nausea and vomiting  Outcome: Progressing     Problem: Genitourinary - Adult  Goal: Urinary catheter remains patent  Outcome: Progressing     Problem: Infection - Adult  Goal: Absence of infection at discharge  Outcome: Progressing     Problem: Metabolic/Fluid and Electrolytes - Adult  Goal: Electrolytes  maintained within normal limits  Outcome: Progressing  Goal: Glucose maintained within prescribed range  Outcome: Progressing     Problem: Hematologic - Adult  Goal: Maintains hematologic stability  Outcome: Progressing     Problem: Pain  Goal: Verbalizes/displays adequate comfort level or baseline comfort level  Outcome: Progressing     Problem: Chronic Conditions and Co-morbidities  Goal: Patient's chronic conditions and co-morbidity symptoms are monitored and maintained or improved  Outcome: Progressing     Problem: Nutrition Deficit:  Goal: Optimize nutritional status  9/26/2024 0115 by Collette Mckinnon RN  Outcome: Progressing  9/25/2024 1424 by Lakia Grider RD, LD  Flowsheets  Taken 9/25/2024 1424  Nutrient intake appropriate for improving, restoring, or maintaining nutritional needs:   Monitor oral intake, labs, and treatment plans   Recommend appropriate diets, oral nutritional supplements, and vitamin/mineral supplements  Taken 9/25/2024 1342  Nutrient intake appropriate for improving, restoring, or maintaining nutritional needs:   Monitor oral intake, labs, and treatment plans   Recommend appropriate diets, oral nutritional supplements, and vitamin/mineral supplements  Note: Nutrition Problem #1: Increased nutrient needs  Intervention: Food and/or Nutrient Delivery: Continue Current Diet, Continue Oral Nutrition Supplement

## 2024-09-26 NOTE — PROGRESS NOTES
Phone: [phone] [facility]  Date: 2024  Fax: [phone]      Physical Therapy    Daily Note    Patient Name: Yayo Simeon      : 1940  (84 y.o.)  MRN: [id number]     Pt is PROGRESSING toward goals and increased independence of mobility this treatment session                Supine to Sit: Moderate assistance         Sit to Stand: Minimal Assistance, Moderate Assistance (x2)  Stand to Sit: Minimal Assistance, Moderate Assistance (x2)  Bed to Chair: Moderate assistance (x2)             WB Status: transfered from bed to chair to bedside commode to chair with wheeled walker and min/mod  x 1-2                Assessment: Patient in bed upon arrrival to room with wife also present.  She is leaving and patient agrees to work with PTA. Supine to sit is min/mod x 1.  Sitting balance at edge of bed is good. Sit to stand from bed with height of bed elevated slightly is min x 1-2.  Ambulates wiht wheeled walker to bedside chair and prior to sitting he states he needs to sit on commode.  Bedside commode is brought to him and he sits on commode extended period of time.  Sit to stand when he is finished requires mod x 1-2.  Fair standing balance to have bottom cleaned.  Transfes to bedside chair with wheeled walker and min/mod x 1-2.  Completes seated B LE exercises.  Remains up in chair with call light in reach.  Safety Devices  Type of Devices: Call light within reach, Gait belt, Left in chair, Nurse notified          Call light in reach, Phone in reach, Use of Gait belt, Nurse Notified, and Left in chair  Time In: 1443  Time Out: 1530  Timed Coded Minutes: 35  Total Treatment Time: 47       Exercises:  See Flowsheets    Plan  Cont Per Plan Of Care    Goals  Short Term Goals  Time Frame for Short Term Goals: 5 days - expires 24  Short Term Goal 1: Transfer supine to sit with min/mod assist x 1-MET  Short Term Goal 2: Transfer bed to chair using wh walker and min/mod assist x 1  Short Term Goal

## 2024-09-27 PROCEDURE — 6360000002 HC RX W HCPCS: Performed by: INTERNAL MEDICINE

## 2024-09-27 PROCEDURE — 97110 THERAPEUTIC EXERCISES: CPT

## 2024-09-27 PROCEDURE — 94761 N-INVAS EAR/PLS OXIMETRY MLT: CPT

## 2024-09-27 PROCEDURE — 6370000000 HC RX 637 (ALT 250 FOR IP): Performed by: INTERNAL MEDICINE

## 2024-09-27 PROCEDURE — 2580000003 HC RX 258: Performed by: INTERNAL MEDICINE

## 2024-09-27 PROCEDURE — 1200000002 HC SEMI PRIVATE SWING BED

## 2024-09-27 PROCEDURE — 97530 THERAPEUTIC ACTIVITIES: CPT

## 2024-09-27 RX ORDER — DOCUSATE SODIUM 100 MG/1
100 CAPSULE, LIQUID FILLED ORAL 2 TIMES DAILY PRN
Status: DISCONTINUED | OUTPATIENT
Start: 2024-09-27 | End: 2024-10-03 | Stop reason: HOSPADM

## 2024-09-27 RX ADMIN — SODIUM CHLORIDE, PRESERVATIVE FREE 10 ML: 5 INJECTION INTRAVENOUS at 08:09

## 2024-09-27 RX ADMIN — CEFTRIAXONE SODIUM 1000 MG: 1 INJECTION, POWDER, FOR SOLUTION INTRAMUSCULAR; INTRAVENOUS at 08:17

## 2024-09-27 RX ADMIN — OXYCODONE HYDROCHLORIDE AND ACETAMINOPHEN 1 TABLET: 5; 325 TABLET ORAL at 13:12

## 2024-09-27 RX ADMIN — FAMOTIDINE 20 MG: 20 TABLET, FILM COATED ORAL at 08:05

## 2024-09-27 RX ADMIN — Medication 500 MCG: at 08:05

## 2024-09-27 RX ADMIN — OXYCODONE HYDROCHLORIDE AND ACETAMINOPHEN 500 MG: 500 TABLET ORAL at 08:06

## 2024-09-27 RX ADMIN — SODIUM CHLORIDE, PRESERVATIVE FREE 10 ML: 5 INJECTION INTRAVENOUS at 19:52

## 2024-09-27 RX ADMIN — LISINOPRIL 2.5 MG: 5 TABLET ORAL at 08:06

## 2024-09-27 RX ADMIN — FAMOTIDINE 20 MG: 20 TABLET, FILM COATED ORAL at 19:52

## 2024-09-27 RX ADMIN — FERROUS SULFATE TAB 325 MG (65 MG ELEMENTAL FE) 325 MG: 325 (65 FE) TAB at 08:05

## 2024-09-27 RX ADMIN — ALOGLIPTIN 12.5 MG: 12.5 TABLET, FILM COATED ORAL at 08:05

## 2024-09-27 RX ADMIN — METFORMIN HYDROCHLORIDE 1000 MG: 500 TABLET, EXTENDED RELEASE ORAL at 08:05

## 2024-09-27 RX ADMIN — ENOXAPARIN SODIUM 40 MG: 100 INJECTION SUBCUTANEOUS at 08:05

## 2024-09-27 RX ADMIN — DOCUSATE SODIUM 100 MG: 100 CAPSULE, LIQUID FILLED ORAL at 08:05

## 2024-09-27 ASSESSMENT — PAIN DESCRIPTION - DESCRIPTORS: DESCRIPTORS: ACHING

## 2024-09-27 ASSESSMENT — PAIN SCALES - GENERAL
PAINLEVEL_OUTOF10: 0
PAINLEVEL_OUTOF10: 7
PAINLEVEL_OUTOF10: 0

## 2024-09-27 ASSESSMENT — PAIN - FUNCTIONAL ASSESSMENT
PAIN_FUNCTIONAL_ASSESSMENT: ACTIVITIES ARE NOT PREVENTED
PAIN_FUNCTIONAL_ASSESSMENT: 0-10

## 2024-09-27 ASSESSMENT — PAIN DESCRIPTION - ORIENTATION: ORIENTATION: LEFT

## 2024-09-27 ASSESSMENT — PAIN DESCRIPTION - LOCATION: LOCATION: HIP

## 2024-09-27 NOTE — PROGRESS NOTES
Phone: [phone] [facility]  Date: 2024  Fax: [phone]      Physical Therapy    Daily Note    Patient Name: Yayo Simeon      : 1940  (84 y.o.)  MRN: [id number]     Pt is PROGRESSING toward goals and increased independence of mobility this treatment session  Discharge Recommendations: Subacute/Skilled Nursing Facility     Assessment  Bed mobility  Supine to Sit: Moderate assistance, 2 Person assistance  Sit to Supine: Moderate assistance, Maximum assistance, 2 Person assistance     Supine to Sit: Moderate assistance  Sit to Supine: Moderate assistance (x2)       Sit to Stand: Minimal Assistance (x2)  Stand to Sit: Minimal Assistance (x2)  Bed to Chair: Minimal assistance (x2)  WB Status: chair to bed to bedside commode to bed with wheeled walker and in x 2     Assessment: Pt supine on arrival. Pt refuses OOB activity but with gentle encouragement participates in supine exercises to promoted improved strength and mobility.  Pt with no c/o increased pain post session. Pt remains in bed at this time, no other needs.  Safety Devices  Type of Devices: Left in bed          Call light in reach, Phone in reach, and Left in bed  Time In: 1430  Time Out: 1450  Timed Coded Minutes: 20  Total Treatment Time: 20  Timed Code Treatment Minutes: 20 Minutes    Exercises:  See Flowsheets    Plan  Cont Per Plan Of Care    Goals  Short Term Goals  Time Frame for Short Term Goals: 5 days - expires 24  Short Term Goal 1: Transfer supine to sit with min/mod assist x 1-MET  Short Term Goal 2: Transfer bed to chair using wh walker and min/mod assist x 1  Short Term Goal 3: Sit at bedside with good balance to complete self-care tasks  Short Term Goal 4: Recall of hip precautions-MET       Long Term Goals  Time Frame for Long Term Goals : 14 days - expires 10/9/24  Long Term Goal 1: Complete basic transfers in/OOB and chair/commode with supervision to safely return home  Long Term Goal 2: Ambulate with wh

## 2024-09-27 NOTE — PLAN OF CARE
Problem: Discharge Planning  Goal: Discharge to home or other facility with appropriate resources  9/26/2024 2215 by Amy Ramsay RN  Outcome: Progressing  9/26/2024 0848 by Sirisha Scales RN  Outcome: Progressing     Problem: Safety - Adult  Goal: Free from fall injury  9/26/2024 2215 by Amy Ramsay RN  Outcome: Progressing  9/26/2024 0848 by Sirisha Scales RN  Outcome: Progressing     Problem: ABCDS Injury Assessment  Goal: Absence of physical injury  9/26/2024 2215 by Amy Ramsay RN  Outcome: Progressing  9/26/2024 0848 by Sirisha Scales RN  Outcome: Progressing     Problem: Skin/Tissue Integrity  Goal: Absence of new skin breakdown  Description: 1.  Monitor for areas of redness and/or skin breakdown  2.  Assess vascular access sites hourly  3.  Every 4-6 hours minimum:  Change oxygen saturation probe site  4.  Every 4-6 hours:  If on nasal continuous positive airway pressure, respiratory therapy assess nares and determine need for appliance change or resting period.  9/26/2024 2215 by Amy Ramsay RN  Outcome: Progressing  9/26/2024 0848 by Sirisha Scales RN  Outcome: Progressing     Problem: Neurosensory - Adult  Goal: Achieves stable or improved neurological status  9/26/2024 2215 by Amy Ramsay RN  Outcome: Progressing  9/26/2024 0848 by Sirisha Scales RN  Outcome: Progressing     Problem: Respiratory - Adult  Goal: Achieves optimal ventilation and oxygenation  9/26/2024 2215 by Amy Ramsay RN  Outcome: Progressing  9/26/2024 0848 by Sirisha Scales RN  Outcome: Progressing     Problem: Cardiovascular - Adult  Goal: Maintains optimal cardiac output and hemodynamic stability  9/26/2024 2215 by Amy Ramsay RN  Outcome: Progressing  9/26/2024 0848 by Sirisha Scales RN  Outcome: Progressing     Problem: Skin/Tissue Integrity - Adult  Goal: Incisions, wounds, or drain sites healing without S/S of infection  9/26/2024 2215 by Amy Ramsay  RN  Outcome: Progressing  9/26/2024 0848 by Sirisha Scales RN  Outcome: Progressing     Problem: Musculoskeletal - Adult  Goal: Return mobility to safest level of function  9/26/2024 2215 by Amy Ramsay RN  Outcome: Progressing  9/26/2024 0848 by Sirisha Scales RN  Outcome: Progressing  Goal: Maintain proper alignment of affected body part  9/26/2024 2215 by Amy Ramsay RN  Outcome: Progressing  9/26/2024 0848 by Sirisha Scales RN  Outcome: Progressing  Goal: Return ADL status to a safe level of function  9/26/2024 2215 by Amy Ramsay RN  Outcome: Progressing  9/26/2024 0848 by Sirisha Scales RN  Outcome: Progressing     Problem: Gastrointestinal - Adult  Goal: Minimal or absence of nausea and vomiting  9/26/2024 2215 by Amy Ramsay RN  Outcome: Progressing  9/26/2024 0848 by Sirisha Scales RN  Outcome: Progressing     Problem: Genitourinary - Adult  Goal: Urinary catheter remains patent  9/26/2024 2215 by Aym Ramsay RN  Outcome: Progressing  9/26/2024 0848 by Sirisha Scales RN  Outcome: Progressing     Problem: Infection - Adult  Goal: Absence of infection at discharge  9/26/2024 2215 by Amy Ramsay RN  Outcome: Progressing  9/26/2024 0848 by Sirisha Scales RN  Outcome: Progressing     Problem: Metabolic/Fluid and Electrolytes - Adult  Goal: Electrolytes maintained within normal limits  9/26/2024 2215 by Amy Ramsay RN  Outcome: Progressing  9/26/2024 0848 by Sirisha Scales RN  Outcome: Progressing  Goal: Glucose maintained within prescribed range  9/26/2024 2215 by Amy Ramsay RN  Outcome: Progressing  9/26/2024 0848 by Sirisha Scales RN  Outcome: Progressing     Problem: Hematologic - Adult  Goal: Maintains hematologic stability  9/26/2024 2215 by Amy Ramsay RN  Outcome: Progressing  9/26/2024 0848 by Sirisha Scales RN  Outcome: Progressing     Problem: Pain  Goal: Verbalizes/displays adequate comfort level or baseline comfort

## 2024-09-27 NOTE — PROGRESS NOTES
Phone: [phone] [facility]  Date: 2024  Fax: [phone]      Physical Therapy    Daily Note    Patient Name: Yayo Simeon      : 1940  (84 y.o.)  MRN: [id number]     Pt is PROGRESSING toward goals and increased independence of mobility this treatment session        Assessment          Sit to Supine: Moderate assistance (x2)       Sit to Stand: Minimal Assistance (x2)  Stand to Sit: Minimal Assistance (x2)  Bed to Chair: Minimal assistance (x2)             WB Status: chair to bed to bedside commode to bed with wheeled walker and in x 2                Assessment: Patient seated in chair upon entry to room.  Able to complete seated B LE exercises as outlined above.  Requests to return to bed.  Sit to stand from chair is min/mod x 2.  Ambulates with wheeled walker to bedside and before sitting down he requests need for bedside commode.  This is brought to him and he sits for extended period of time.  When finshed, he completes sit to stand from commode with min/mod assist.  Completes stand pivot back to bed with wheeled walker and min x 2. Sit to supine is mod x 2.  In bed following with call light in reach and now wife is present in room.  Safety Devices  Type of Devices: Call light within reach, Gait belt, Left in bed, Nurse notified          Call light in reach, Phone in reach, Use of Gait belt, Nurse Notified, Family Present, and Left in bed  Time In: 1113  Time Out: 1155  Timed Coded Minutes: 32  Total Treatment Time: 42       Exercises:  See Flowsheets    Plan  Cont Per Plan Of Care    Goals  Short Term Goals  Time Frame for Short Term Goals: 5 days - expires 24  Short Term Goal 1: Transfer supine to sit with min/mod assist x 1-MET  Short Term Goal 2: Transfer bed to chair using wh walker and min/mod assist x 1  Short Term Goal 3: Sit at bedside with good balance to complete self-care tasks  Short Term Goal 4: Recall of hip precautions-MET       Long Term Goals  Time Frame for  Long Term Goals : 14 days - expires 10/9/24  Long Term Goal 1: Complete basic transfers in/OOB and chair/commode with supervision to safely return home  Long Term Goal 2: Ambulate with wh walker 50-75 ft with supervision to safely negotiate home environment  Long Term Goal 3: Good standing balance to complete self-car tasks  Long Term Goal 4: Up/down steps with HR and CGA       Li Schaefer License Number: [id number]    Date: 9/27/2024

## 2024-09-27 NOTE — PLAN OF CARE
Problem: Discharge Planning  Goal: Discharge to home or other facility with appropriate resources  9/27/2024 0915 by Hope Brunner RN  Outcome: Progressing  9/26/2024 2215 by Amy Ramsay RN  Outcome: Progressing     Problem: Safety - Adult  Goal: Free from fall injury  9/27/2024 0915 by Hope Brunner RN  Outcome: Progressing  9/26/2024 2215 by Amy Ramsay RN  Outcome: Progressing     Problem: ABCDS Injury Assessment  Goal: Absence of physical injury  9/27/2024 0915 by Hope Brunner RN  Outcome: Progressing  9/26/2024 2215 by Amy Ramsay RN  Outcome: Progressing     Problem: Skin/Tissue Integrity  Goal: Absence of new skin breakdown  Description: 1.  Monitor for areas of redness and/or skin breakdown  2.  Assess vascular access sites hourly  3.  Every 4-6 hours minimum:  Change oxygen saturation probe site  4.  Every 4-6 hours:  If on nasal continuous positive airway pressure, respiratory therapy assess nares and determine need for appliance change or resting period.  9/27/2024 0915 by Hope Brunner RN  Outcome: Progressing  9/26/2024 2215 by Amy Ramsay RN  Outcome: Progressing     Problem: Neurosensory - Adult  Goal: Achieves stable or improved neurological status  9/27/2024 0915 by Hope Brunner RN  Outcome: Progressing  9/26/2024 2215 by Amy Ramsay RN  Outcome: Progressing     Problem: Respiratory - Adult  Goal: Achieves optimal ventilation and oxygenation  9/27/2024 0915 by Hope Brunner RN  Outcome: Progressing  9/26/2024 2215 by Amy Ramsay RN  Outcome: Progressing     Problem: Cardiovascular - Adult  Goal: Maintains optimal cardiac output and hemodynamic stability  9/27/2024 0915 by Hope Brunner RN  Outcome: Progressing  9/26/2024 2215 by Amy Ramsay RN  Outcome: Progressing     Problem: Skin/Tissue Integrity - Adult  Goal: Incisions, wounds, or drain sites healing without S/S of infection  9/27/2024 0915 by Hope Brunner RN  Outcome: Progressing  9/26/2024 2215 by  MIGUEL Reyes  Outcome: Progressing     Problem: Chronic Conditions and Co-morbidities  Goal: Patient's chronic conditions and co-morbidity symptoms are monitored and maintained or improved  9/27/2024 0915 by Hope Brunner RN  Outcome: Progressing  9/26/2024 2215 by Amy Ramsay RN  Outcome: Progressing     Problem: Nutrition Deficit:  Goal: Optimize nutritional status  9/27/2024 0915 by Hope Brunner RN  Outcome: Progressing  9/26/2024 2215 by Amy Ramsay RN  Outcome: Progressing

## 2024-09-27 NOTE — PLAN OF CARE
Problem: Physical Therapy - Adult  Goal: By Discharge: Performs mobility at highest level of function for planned discharge setting.  See evaluation for individualized goals.  Outcome: Progressing

## 2024-09-28 LAB
ANION GAP SERPL CALCULATED.3IONS-SCNC: 10 MMOL/L (ref 9–17)
BASOPHILS # BLD: 0.02 K/UL (ref 0–0.2)
BASOPHILS NFR BLD: 0 % (ref 0–2)
BUN SERPL-MCNC: 35 MG/DL (ref 8–23)
BUN/CREAT SERPL: 35 (ref 9–20)
CALCIUM SERPL-MCNC: 9.2 MG/DL (ref 8.6–10.4)
CHLORIDE SERPL-SCNC: 101 MMOL/L (ref 98–107)
CO2 SERPL-SCNC: 27 MMOL/L (ref 20–31)
CREAT SERPL-MCNC: 1 MG/DL (ref 0.7–1.2)
EOSINOPHIL # BLD: 0.5 K/UL (ref 0–0.4)
EOSINOPHILS RELATIVE PERCENT: 6 % (ref 0–5)
ERYTHROCYTE [DISTWIDTH] IN BLOOD BY AUTOMATED COUNT: 13.3 % (ref 12.1–15.2)
GFR, ESTIMATED: 75 ML/MIN/1.73M2
GLUCOSE SERPL-MCNC: 110 MG/DL (ref 70–99)
HCT VFR BLD AUTO: 29.1 % (ref 41–53)
HGB BLD-MCNC: 9.8 G/DL (ref 13.5–17.5)
IMM GRANULOCYTES # BLD AUTO: 0.06 K/UL (ref 0–0.3)
IMM GRANULOCYTES NFR BLD: 1 % (ref 0–5)
LYMPHOCYTES NFR BLD: 0.87 K/UL (ref 1–4.8)
LYMPHOCYTES RELATIVE PERCENT: 11 % (ref 13–44)
MCH RBC QN AUTO: 30.5 PG (ref 26–34)
MCHC RBC AUTO-ENTMCNC: 33.7 G/DL (ref 31–37)
MCV RBC AUTO: 90.7 FL (ref 80–100)
MONOCYTES NFR BLD: 0.86 K/UL (ref 0–1)
MONOCYTES NFR BLD: 11 % (ref 5–9)
NEUTROPHILS NFR BLD: 71 % (ref 39–75)
NEUTS SEG NFR BLD: 5.61 K/UL (ref 2.1–6.5)
PLATELET # BLD AUTO: 218 K/UL (ref 140–450)
PMV BLD AUTO: 10.5 FL (ref 6–12)
POTASSIUM SERPL-SCNC: 4.4 MMOL/L (ref 3.7–5.3)
RBC # BLD AUTO: 3.21 M/UL (ref 4.5–5.9)
SODIUM SERPL-SCNC: 138 MMOL/L (ref 135–144)
WBC OTHER # BLD: 7.9 K/UL (ref 3.5–11)

## 2024-09-28 PROCEDURE — 1200000002 HC SEMI PRIVATE SWING BED

## 2024-09-28 PROCEDURE — 2580000003 HC RX 258: Performed by: INTERNAL MEDICINE

## 2024-09-28 PROCEDURE — 6360000002 HC RX W HCPCS: Performed by: INTERNAL MEDICINE

## 2024-09-28 PROCEDURE — 97110 THERAPEUTIC EXERCISES: CPT

## 2024-09-28 PROCEDURE — 36415 COLL VENOUS BLD VENIPUNCTURE: CPT

## 2024-09-28 PROCEDURE — 80048 BASIC METABOLIC PNL TOTAL CA: CPT

## 2024-09-28 PROCEDURE — 97116 GAIT TRAINING THERAPY: CPT

## 2024-09-28 PROCEDURE — 85025 COMPLETE CBC W/AUTO DIFF WBC: CPT

## 2024-09-28 PROCEDURE — 6370000000 HC RX 637 (ALT 250 FOR IP): Performed by: INTERNAL MEDICINE

## 2024-09-28 PROCEDURE — 94761 N-INVAS EAR/PLS OXIMETRY MLT: CPT

## 2024-09-28 RX ADMIN — Medication 500 MCG: at 09:17

## 2024-09-28 RX ADMIN — TRAMADOL HYDROCHLORIDE 50 MG: 50 TABLET ORAL at 09:31

## 2024-09-28 RX ADMIN — CEFTRIAXONE SODIUM 1000 MG: 1 INJECTION, POWDER, FOR SOLUTION INTRAMUSCULAR; INTRAVENOUS at 09:25

## 2024-09-28 RX ADMIN — FAMOTIDINE 20 MG: 20 TABLET, FILM COATED ORAL at 19:36

## 2024-09-28 RX ADMIN — FAMOTIDINE 20 MG: 20 TABLET, FILM COATED ORAL at 09:17

## 2024-09-28 RX ADMIN — LISINOPRIL 2.5 MG: 5 TABLET ORAL at 09:17

## 2024-09-28 RX ADMIN — SODIUM CHLORIDE, PRESERVATIVE FREE 10 ML: 5 INJECTION INTRAVENOUS at 19:36

## 2024-09-28 RX ADMIN — OXYCODONE HYDROCHLORIDE AND ACETAMINOPHEN 500 MG: 500 TABLET ORAL at 09:18

## 2024-09-28 RX ADMIN — METFORMIN HYDROCHLORIDE 1000 MG: 500 TABLET, EXTENDED RELEASE ORAL at 09:17

## 2024-09-28 RX ADMIN — Medication 5 MG: at 21:09

## 2024-09-28 RX ADMIN — ENOXAPARIN SODIUM 40 MG: 100 INJECTION SUBCUTANEOUS at 09:17

## 2024-09-28 RX ADMIN — SODIUM CHLORIDE, PRESERVATIVE FREE 10 ML: 5 INJECTION INTRAVENOUS at 09:16

## 2024-09-28 RX ADMIN — ASPIRIN 81 MG 81 MG: 81 TABLET ORAL at 09:17

## 2024-09-28 RX ADMIN — FERROUS SULFATE TAB 325 MG (65 MG ELEMENTAL FE) 325 MG: 325 (65 FE) TAB at 09:17

## 2024-09-28 RX ADMIN — ALOGLIPTIN 12.5 MG: 12.5 TABLET, FILM COATED ORAL at 09:18

## 2024-09-28 ASSESSMENT — PAIN DESCRIPTION - ORIENTATION: ORIENTATION: LEFT

## 2024-09-28 ASSESSMENT — PAIN SCALES - GENERAL
PAINLEVEL_OUTOF10: 0
PAINLEVEL_OUTOF10: 4

## 2024-09-28 ASSESSMENT — PAIN DESCRIPTION - DESCRIPTORS: DESCRIPTORS: ACHING

## 2024-09-28 ASSESSMENT — PAIN - FUNCTIONAL ASSESSMENT: PAIN_FUNCTIONAL_ASSESSMENT: PREVENTS OR INTERFERES SOME ACTIVE ACTIVITIES AND ADLS

## 2024-09-28 ASSESSMENT — PAIN DESCRIPTION - LOCATION: LOCATION: HIP

## 2024-09-28 NOTE — PLAN OF CARE
Problem: Discharge Planning  Goal: Discharge to home or other facility with appropriate resources  9/27/2024 2230 by Sandra Brooke RN  Outcome: Progressing  Flowsheets (Taken 9/27/2024 2000)  Discharge to home or other facility with appropriate resources: Identify barriers to discharge with patient and caregiver  9/27/2024 0915 by Hope Brunner RN  Outcome: Progressing     Problem: Safety - Adult  Goal: Free from fall injury  9/27/2024 2230 by Sandra Brooke RN  Outcome: Progressing  9/27/2024 0915 by Hope Brunner RN  Outcome: Progressing     Problem: ABCDS Injury Assessment  Goal: Absence of physical injury  9/27/2024 2230 by Sandra Brooke RN  Outcome: Progressing  9/27/2024 0915 by Hope Brunner RN  Outcome: Progressing     Problem: Skin/Tissue Integrity  Goal: Absence of new skin breakdown  Description: 1.  Monitor for areas of redness and/or skin breakdown  2.  Assess vascular access sites hourly  3.  Every 4-6 hours minimum:  Change oxygen saturation probe site  4.  Every 4-6 hours:  If on nasal continuous positive airway pressure, respiratory therapy assess nares and determine need for appliance change or resting period.  9/27/2024 2230 by Sandra Brooke RN  Outcome: Progressing  9/27/2024 0915 by Hope Brunner RN  Outcome: Progressing     Problem: Neurosensory - Adult  Goal: Achieves stable or improved neurological status  9/27/2024 2230 by Sandra Brooke RN  Outcome: Progressing  Flowsheets (Taken 9/27/2024 2000)  Achieves stable or improved neurological status: Assess for and report changes in neurological status  9/27/2024 0915 by Hope Brunner RN  Outcome: Progressing     Problem: Respiratory - Adult  Goal: Achieves optimal ventilation and oxygenation  9/27/2024 2230 by Sandra Brooke RN  Outcome: Progressing  Flowsheets (Taken 9/27/2024 2000)  Achieves optimal ventilation and oxygenation:   Assess for changes in respiratory status   Assess for changes in mentation and behavior   Position to  Return ADL status to a safe level of function  9/27/2024 2230 by Sandra Brooke RN  Outcome: Progressing  Flowsheets (Taken 9/27/2024 2000)  Return ADL Status to a Safe Level of Function:   Administer medication as ordered   Assess activities of daily living deficits and provide assistive devices as needed  9/27/2024 0915 by Hope Brunner RN  Outcome: Progressing     Problem: Gastrointestinal - Adult  Goal: Minimal or absence of nausea and vomiting  9/27/2024 2230 by Sandra Brooke RN  Outcome: Progressing  9/27/2024 0915 by Hope Brunner RN  Outcome: Progressing     Problem: Genitourinary - Adult  Goal: Urinary catheter remains patent  9/27/2024 2230 by Sandra Brooke RN  Outcome: Progressing  Flowsheets (Taken 9/27/2024 2000)  Urinary catheter remains patent: Assess patency of urinary catheter  9/27/2024 0915 by Hope Brunner RN  Outcome: Progressing     Problem: Infection - Adult  Goal: Absence of infection at discharge  9/27/2024 2230 by Sandra Brooke RN  Outcome: Progressing  Flowsheets (Taken 9/27/2024 2000)  Absence of infection at discharge: Assess and monitor for signs and symptoms of infection  9/27/2024 0915 by Hope Brunner RN  Outcome: Progressing     Problem: Metabolic/Fluid and Electrolytes - Adult  Goal: Electrolytes maintained within normal limits  9/27/2024 2230 by Sandra Brooke RN  Outcome: Progressing  9/27/2024 0915 by Hope Brunner RN  Outcome: Progressing  Goal: Glucose maintained within prescribed range  9/27/2024 2230 by Sandra Brooke RN  Outcome: Progressing  Flowsheets (Taken 9/27/2024 2000)  Glucose maintained within prescribed range: Monitor blood glucose as ordered  9/27/2024 0915 by Hope Brunner RN  Outcome: Progressing     Problem: Hematologic - Adult  Goal: Maintains hematologic stability  9/27/2024 2230 by Sandra Brooke RN  Outcome: Progressing  Flowsheets (Taken 9/27/2024 2000)  Maintains hematologic stability: Assess for signs and symptoms of bleeding or

## 2024-09-28 NOTE — PROGRESS NOTES
Phone: [phone] [facility]  Date: 2024  Fax: [phone]      Physical Therapy    Daily Note    Patient Name: Yayo Simeon      : 1940  (84 y.o.)  MRN: [id number]     Pt is PROGRESSING toward goals and increased independence of mobility this treatment session  Discharge Recommendations: Subacute/Skilled Nursing Facility     Assessment                          Assessment: Patient up in chair upon arrival.  Completed semi reclined and seated ex per log with good tolerance.  Minimal pain voiced.  Transfers sit to stand with CGA and ambulated to bathroom x 15 ft with wh walker and CGA.  Short steps and narrow HUNTER but good tolerance to weight bearing and no LOB.   Requires assist with pericare and donning/doffing briefs.  Overall mobility much improved and will progress as able  Safety Devices  Type of Devices: Left in bed          Call light in reach, Use of Gait belt, and Left in chair  Time In: 1135  Time Out: 1200  Timed Coded Minutes: 25  Total Treatment Time: 25      Exercises:  See Flowsheets    Plan  Cont Per Plan Of Care    Goals  Short Term Goals  Time Frame for Short Term Goals: 5 days - expires 24  Short Term Goal 1: Transfer supine to sit with min/mod assist x 1-MET  Short Term Goal 2: Transfer bed to chair using wh walker and min/mod assist x 1  Short Term Goal 3: Sit at bedside with good balance to complete self-care tasks  Short Term Goal 4: Recall of hip precautions-MET       Long Term Goals  Time Frame for Long Term Goals : 14 days - expires 10/9/24  Long Term Goal 1: Complete basic transfers in/OOB and chair/commode with supervision to safely return home  Long Term Goal 2: Ambulate with wh walker 50-75 ft with supervision to safely negotiate home environment  Long Term Goal 3: Good standing balance to complete self-car tasks  Long Term Goal 4: Up/down steps with HR and CGA       EDWARD DONIS, PT Therapy License Number: [id number]    Date: 2024

## 2024-09-29 PROCEDURE — 97116 GAIT TRAINING THERAPY: CPT

## 2024-09-29 PROCEDURE — 97110 THERAPEUTIC EXERCISES: CPT

## 2024-09-29 PROCEDURE — 6360000002 HC RX W HCPCS: Performed by: INTERNAL MEDICINE

## 2024-09-29 PROCEDURE — 94761 N-INVAS EAR/PLS OXIMETRY MLT: CPT

## 2024-09-29 PROCEDURE — 6370000000 HC RX 637 (ALT 250 FOR IP): Performed by: INTERNAL MEDICINE

## 2024-09-29 PROCEDURE — 1200000002 HC SEMI PRIVATE SWING BED

## 2024-09-29 PROCEDURE — 2580000003 HC RX 258: Performed by: INTERNAL MEDICINE

## 2024-09-29 RX ADMIN — SODIUM CHLORIDE, PRESERVATIVE FREE 10 ML: 5 INJECTION INTRAVENOUS at 09:15

## 2024-09-29 RX ADMIN — Medication 500 MCG: at 09:11

## 2024-09-29 RX ADMIN — FAMOTIDINE 20 MG: 20 TABLET, FILM COATED ORAL at 20:17

## 2024-09-29 RX ADMIN — SODIUM CHLORIDE, PRESERVATIVE FREE 20 ML: 5 INJECTION INTRAVENOUS at 20:17

## 2024-09-29 RX ADMIN — FAMOTIDINE 20 MG: 20 TABLET, FILM COATED ORAL at 09:11

## 2024-09-29 RX ADMIN — METFORMIN HYDROCHLORIDE 1000 MG: 1000 TABLET, FILM COATED ORAL at 16:52

## 2024-09-29 RX ADMIN — ASPIRIN 81 MG 81 MG: 81 TABLET ORAL at 09:11

## 2024-09-29 RX ADMIN — OXYCODONE HYDROCHLORIDE AND ACETAMINOPHEN 500 MG: 500 TABLET ORAL at 09:11

## 2024-09-29 RX ADMIN — ALOGLIPTIN 12.5 MG: 12.5 TABLET, FILM COATED ORAL at 09:11

## 2024-09-29 RX ADMIN — CEFTRIAXONE SODIUM 1000 MG: 1 INJECTION, POWDER, FOR SOLUTION INTRAMUSCULAR; INTRAVENOUS at 09:18

## 2024-09-29 RX ADMIN — SODIUM CHLORIDE, PRESERVATIVE FREE 10 ML: 5 INJECTION INTRAVENOUS at 20:18

## 2024-09-29 RX ADMIN — FERROUS SULFATE TAB 325 MG (65 MG ELEMENTAL FE) 325 MG: 325 (65 FE) TAB at 09:11

## 2024-09-29 RX ADMIN — TRAMADOL HYDROCHLORIDE 50 MG: 50 TABLET ORAL at 09:14

## 2024-09-29 RX ADMIN — METFORMIN HYDROCHLORIDE 1000 MG: 1000 TABLET, FILM COATED ORAL at 09:14

## 2024-09-29 RX ADMIN — LISINOPRIL 2.5 MG: 5 TABLET ORAL at 09:11

## 2024-09-29 RX ADMIN — CYCLOBENZAPRINE 5 MG: 10 TABLET, FILM COATED ORAL at 14:08

## 2024-09-29 RX ADMIN — ENOXAPARIN SODIUM 40 MG: 100 INJECTION SUBCUTANEOUS at 09:11

## 2024-09-29 ASSESSMENT — PAIN SCALES - GENERAL
PAINLEVEL_OUTOF10: 0
PAINLEVEL_OUTOF10: 0
PAINLEVEL_OUTOF10: 2
PAINLEVEL_OUTOF10: 4

## 2024-09-29 ASSESSMENT — PAIN DESCRIPTION - DESCRIPTORS: DESCRIPTORS: ACHING

## 2024-09-29 ASSESSMENT — PAIN - FUNCTIONAL ASSESSMENT: PAIN_FUNCTIONAL_ASSESSMENT: PREVENTS OR INTERFERES SOME ACTIVE ACTIVITIES AND ADLS

## 2024-09-29 ASSESSMENT — PAIN DESCRIPTION - LOCATION: LOCATION: HIP

## 2024-09-29 ASSESSMENT — PAIN DESCRIPTION - ORIENTATION: ORIENTATION: LEFT

## 2024-09-29 NOTE — PLAN OF CARE
Problem: Discharge Planning  Goal: Discharge to home or other facility with appropriate resources  9/28/2024 2312 by Sandra Brooke RN  Outcome: Progressing  Flowsheets (Taken 9/28/2024 2000)  Discharge to home or other facility with appropriate resources: Identify barriers to discharge with patient and caregiver  9/28/2024 1735 by Imelda Quinones RN  Outcome: Progressing  Flowsheets (Taken 9/28/2024 1735)  Discharge to home or other facility with appropriate resources: Identify discharge learning needs (meds, wound care, etc)     Problem: Safety - Adult  Goal: Free from fall injury  Outcome: Progressing     Problem: ABCDS Injury Assessment  Goal: Absence of physical injury  Outcome: Progressing     Problem: Skin/Tissue Integrity  Goal: Absence of new skin breakdown  Description: 1.  Monitor for areas of redness and/or skin breakdown  2.  Assess vascular access sites hourly  3.  Every 4-6 hours minimum:  Change oxygen saturation probe site  4.  Every 4-6 hours:  If on nasal continuous positive airway pressure, respiratory therapy assess nares and determine need for appliance change or resting period.  9/28/2024 2312 by Sandra Brooke RN  Outcome: Progressing  9/28/2024 1735 by Imelda Quinones RN  Outcome: Progressing     Problem: Neurosensory - Adult  Goal: Achieves stable or improved neurological status  Outcome: Progressing  Flowsheets (Taken 9/28/2024 2000)  Achieves stable or improved neurological status: Assess for and report changes in neurological status     Problem: Respiratory - Adult  Goal: Achieves optimal ventilation and oxygenation  Outcome: Progressing  Flowsheets (Taken 9/28/2024 2000)  Achieves optimal ventilation and oxygenation: Assess for changes in respiratory status     Problem: Cardiovascular - Adult  Goal: Maintains optimal cardiac output and hemodynamic stability  Outcome: Progressing  Flowsheets (Taken 9/28/2024 2000)  Maintains optimal cardiac output and hemodynamic stability: Monitor  blood pressure and heart rate     Problem: Skin/Tissue Integrity - Adult  Goal: Incisions, wounds, or drain sites healing without S/S of infection  Outcome: Progressing  Flowsheets (Taken 9/28/2024 2000)  Incisions, Wounds, or Drain Sites Healing Without Sign and Symptoms of Infection: TWICE DAILY: Assess and document skin integrity     Problem: Musculoskeletal - Adult  Goal: Return mobility to safest level of function  9/28/2024 2312 by Sandra Brooke RN  Outcome: Progressing  Flowsheets (Taken 9/28/2024 2000)  Return Mobility to Safest Level of Function: Assess patient stability and activity tolerance for standing, transferring and ambulating with or without assistive devices  9/28/2024 1735 by mIelda Quinones RN  Outcome: Progressing  Flowsheets (Taken 9/28/2024 1735)  Return Mobility to Safest Level of Function: Assess patient stability and activity tolerance for standing, transferring and ambulating with or without assistive devices  Goal: Maintain proper alignment of affected body part  Outcome: Progressing  Flowsheets (Taken 9/28/2024 2000)  Maintain proper alignment of affected body part: Support and protect limb and body alignment per provider's orders  Goal: Return ADL status to a safe level of function  Outcome: Progressing  Flowsheets (Taken 9/28/2024 2000)  Return ADL Status to a Safe Level of Function: Administer medication as ordered     Problem: Gastrointestinal - Adult  Goal: Minimal or absence of nausea and vomiting  Outcome: Progressing     Problem: Genitourinary - Adult  Goal: Urinary catheter remains patent  9/28/2024 2312 by Sandra Brooke RN  Outcome: Progressing  Flowsheets (Taken 9/28/2024 2000)  Urinary catheter remains patent: Assess patency of urinary catheter  9/28/2024 1735 by Imelda Quinones, RN  Outcome: Progressing  Flowsheets (Taken 9/28/2024 1735)  Urinary catheter remains patent: Assess patency of urinary catheter     Problem: Infection - Adult  Goal: Absence of infection at

## 2024-09-29 NOTE — PROGRESS NOTES
Phone: [phone] [facility]  Date: 2024  Fax: [phone]      Physical Therapy    Daily Note    Patient Name: Yayo Simeon      : 1940  (84 y.o.)  MRN: [id number]     Pt is PROGRESSING toward goals and increased independence of mobility this treatment session  Discharge Recommendations: Subacute/Skilled Nursing Facility     Assessment  Bed mobility  Supine to Sit: Moderate assistance, 2 Person assistance  Sit to Supine: Moderate assistance, Maximum assistance, 2 Person assistance        Supine to Sit: Moderate assistance  Sit to Supine: Moderate assistance (x2)       Sit to Stand: Minimal Assistance (x2)  Stand to Sit: Minimal Assistance (x2)  Bed to Chair: Minimal assistance (x2)             WB Status: chair to bed to bedside commode to bed with wheeled walker and in x 2                Assessment: Patient in bed upon arrival.  Completed supine ex including resisted ankle pumps, active SAQ, quad sets and heel slides to LLE.  Transferred OOB to left with mod assist.  Ambulated with wh walker WBAT LLE and CGA x 25ft.  Requires assist with pericare but fair+ standing balance.  Showing daily improvement in mobility.  Progress with amb distances for patient to safely negotiate home environment including steps.  Safety Devices  Type of Devices: Left in bed          Call light in reach, Phone in reach, Use of Gait belt, and Left in bed  Time In: 1155  Time Out: 1235  Timed Coded Minutes: 35  Total Treatment Time: 40      Exercises:  See Flowsheets    Plan  Cont Per Plan Of Care    Goals  Short Term Goals  Time Frame for Short Term Goals: 5 days - expires 24  Short Term Goal 1: Transfer supine to sit with min/mod assist x 1-MET  Short Term Goal 2: Transfer bed to chair using wh walker and min/mod assist x 1  Short Term Goal 3: Sit at bedside with good balance to complete self-care tasks  Short Term Goal 4: Recall of hip precautions-MET       Long Term Goals  Time Frame for Long Term Goals

## 2024-09-29 NOTE — PROGRESS NOTES
Hospitalist Progress Note  9/29/2024 7:22 AM  Subjective:   Admit Date: 9/25/2024  PCP: Joshua Suarez MD    Interval History: Yayo has no complaints this morning.  He denies chest pain or SOB.  His leg pain is well controlled with an \"occasional twinge in the knee\".  Appetite not very good but no nausea.  Bowels are moving \"too well\".  Narayan cath in place.  He expresses his happiness with his care.    Diet: ADULT ORAL NUTRITION SUPPLEMENT; Breakfast; Diabetic Oral Supplement  ADULT DIET; Regular; 4 carb choices (60 gm/meal)  Medications:   Scheduled Meds:   ascorbic acid  500 mg Oral Daily    aspirin  81 mg Oral Once per day on Sunday Tuesday Thursday Saturday    ferrous sulfate  325 mg Oral Daily with breakfast    lisinopril  2.5 mg Oral Daily    vitamin B-12  500 mcg Oral Daily    sodium chloride flush  5-40 mL IntraVENous 2 times per day    famotidine  20 mg Oral BID    metFORMIN  1,000 mg Oral Daily with breakfast    And    alogliptin  12.5 mg Oral Daily    cefTRIAXone (ROCEPHIN) IV  1,000 mg IntraVENous Q24H    sodium chloride flush  5-40 mL IntraVENous 2 times per day    enoxaparin  40 mg SubCUTAneous Daily     Continuous Infusions:   sodium chloride      dextrose      sodium chloride         Patient's current medications documented, reviewed, and updated.      CBC:   Recent Labs     09/28/24  0502   WBC 7.9   HGB 9.8*        BMP:    Recent Labs     09/28/24  0502      K 4.4      CO2 27   BUN 35*   CREATININE 1.0   GLUCOSE 110*     Hepatic: No results for input(s): \"AST\", \"ALT\", \"BILITOT\", \"ALKPHOS\" in the last 72 hours.    Invalid input(s): \"ALB\"  Troponin: No results for input(s): \"TROPONINI\" in the last 72 hours.  BNP: No results for input(s): \"BNP\" in the last 72 hours.  Lipids: No results for input(s): \"CHOL\", \"HDL\" in the last 72 hours.    Invalid input(s): \"LDLCALCU\"  INR: No results for input(s): \"INR\" in the last 72 hours.      Objective:   Vitals: /66   Pulse 78

## 2024-09-30 LAB — GLUCOSE BLD-MCNC: 102 MG/DL (ref 65–99)

## 2024-09-30 PROCEDURE — 6360000002 HC RX W HCPCS: Performed by: INTERNAL MEDICINE

## 2024-09-30 PROCEDURE — 94761 N-INVAS EAR/PLS OXIMETRY MLT: CPT

## 2024-09-30 PROCEDURE — 97116 GAIT TRAINING THERAPY: CPT

## 2024-09-30 PROCEDURE — 1200000002 HC SEMI PRIVATE SWING BED

## 2024-09-30 PROCEDURE — 6370000000 HC RX 637 (ALT 250 FOR IP): Performed by: INTERNAL MEDICINE

## 2024-09-30 PROCEDURE — 82947 ASSAY GLUCOSE BLOOD QUANT: CPT

## 2024-09-30 PROCEDURE — 97110 THERAPEUTIC EXERCISES: CPT

## 2024-09-30 PROCEDURE — 97530 THERAPEUTIC ACTIVITIES: CPT

## 2024-09-30 RX ADMIN — LISINOPRIL 2.5 MG: 5 TABLET ORAL at 09:26

## 2024-09-30 RX ADMIN — CYCLOBENZAPRINE 5 MG: 10 TABLET, FILM COATED ORAL at 20:44

## 2024-09-30 RX ADMIN — Medication 5 MG: at 20:44

## 2024-09-30 RX ADMIN — Medication 500 MCG: at 09:26

## 2024-09-30 RX ADMIN — FAMOTIDINE 20 MG: 20 TABLET, FILM COATED ORAL at 09:27

## 2024-09-30 RX ADMIN — ALOGLIPTIN 12.5 MG: 12.5 TABLET, FILM COATED ORAL at 09:25

## 2024-09-30 RX ADMIN — FERROUS SULFATE TAB 325 MG (65 MG ELEMENTAL FE) 325 MG: 325 (65 FE) TAB at 09:27

## 2024-09-30 RX ADMIN — OXYCODONE HYDROCHLORIDE AND ACETAMINOPHEN 500 MG: 500 TABLET ORAL at 09:27

## 2024-09-30 RX ADMIN — METFORMIN HYDROCHLORIDE 1000 MG: 500 TABLET, EXTENDED RELEASE ORAL at 09:25

## 2024-09-30 RX ADMIN — FAMOTIDINE 20 MG: 20 TABLET, FILM COATED ORAL at 20:45

## 2024-09-30 RX ADMIN — OXYCODONE HYDROCHLORIDE AND ACETAMINOPHEN 1 TABLET: 5; 325 TABLET ORAL at 06:44

## 2024-09-30 RX ADMIN — ENOXAPARIN SODIUM 40 MG: 100 INJECTION SUBCUTANEOUS at 09:25

## 2024-09-30 ASSESSMENT — PAIN DESCRIPTION - ORIENTATION: ORIENTATION: LEFT

## 2024-09-30 ASSESSMENT — PAIN SCALES - GENERAL
PAINLEVEL_OUTOF10: 8
PAINLEVEL_OUTOF10: 0

## 2024-09-30 ASSESSMENT — PAIN - FUNCTIONAL ASSESSMENT: PAIN_FUNCTIONAL_ASSESSMENT: NONE - DENIES PAIN

## 2024-09-30 ASSESSMENT — PAIN DESCRIPTION - DESCRIPTORS: DESCRIPTORS: THROBBING

## 2024-09-30 ASSESSMENT — PAIN DESCRIPTION - LOCATION: LOCATION: FOOT

## 2024-09-30 NOTE — PROGRESS NOTES
Attempted to flush pt's peripheral IV to right wrist and it was causing pain to patient. Checked medication list and pt has IV medications complete. This writer removed peripheral IV, pt tolerated well.

## 2024-09-30 NOTE — PLAN OF CARE
Problem: Discharge Planning  Goal: Discharge to home or other facility with appropriate resources  9/30/2024 1134 by Ashley Pinto RN  Outcome: Progressing     Problem: Safety - Adult  Goal: Free from fall injury  Outcome: Progressing     Problem: ABCDS Injury Assessment  Goal: Absence of physical injury  Outcome: Progressing     Problem: Skin/Tissue Integrity  Goal: Absence of new skin breakdown  Description: 1.  Monitor for areas of redness and/or skin breakdown  2.  Assess vascular access sites hourly  3.  Every 4-6 hours minimum:  Change oxygen saturation probe site  4.  Every 4-6 hours:  If on nasal continuous positive airway pressure, respiratory therapy assess nares and determine need for appliance change or resting period.  Outcome: Progressing     Problem: Neurosensory - Adult  Goal: Achieves stable or improved neurological status  Outcome: Progressing     Problem: Respiratory - Adult  Goal: Achieves optimal ventilation and oxygenation  Outcome: Progressing     Problem: Cardiovascular - Adult  Goal: Maintains optimal cardiac output and hemodynamic stability  Outcome: Progressing     Problem: Skin/Tissue Integrity - Adult  Goal: Incisions, wounds, or drain sites healing without S/S of infection  Outcome: Progressing     Problem: Musculoskeletal - Adult  Goal: Return mobility to safest level of function  Outcome: Progressing     Problem: Musculoskeletal - Adult  Goal: Maintain proper alignment of affected body part  Outcome: Progressing     Problem: Musculoskeletal - Adult  Goal: Return ADL status to a safe level of function  Outcome: Progressing     Problem: Gastrointestinal - Adult  Goal: Minimal or absence of nausea and vomiting  Outcome: Progressing     Problem: Genitourinary - Adult  Goal: Urinary catheter remains patent  Outcome: Progressing     Problem: Infection - Adult  Goal: Absence of infection at discharge  Outcome: Progressing     Problem: Metabolic/Fluid and Electrolytes - Adult  Goal:

## 2024-09-30 NOTE — PROGRESS NOTES
Phone: [phone] [facility]  Date: 2024  Fax: [phone]      Physical Therapy    Daily Note    Patient Name: Yayo Simeon      : 1940  (84 y.o.)  MRN: [id number]     Pt is PROGRESSING toward goals and increased independence of mobility this treatment session  Discharge Recommendations: Subacute/Skilled Nursing Facility     Assessment          Sit to Supine: Supervision       Sit to Stand: Contact guard assistance, Stand by assistance  Stand to Sit: Contact guard assistance, Stand by assistance               WB Status: ~100 ft x1 with wheeled walker and CGA                Assessment: Patient seated up in chair upon arrival to room. Wife is also present in room with patient. Sit to stand from chair is SBA/CGA.  Ambulates with wheeled walker and CGA ~100 ft x1.  Upon return to room, patient requests need for bathroom.  Able to sit on BSC in bathroom with SBA.  Sits on commode for short period of time. When he is finished, he stands from BSC with SBA/CGA.  Requests to go back to bed.  Sit to supine is supervision as patient is able to lift B LE up onto bed.  In bed following with call light in reach and wife remains in room following.  Nurse also in room to get vitals.  Safety Devices  Type of Devices: Call light within reach, Gait belt, Left in bed, Nurse notified          Call light in reach, Phone in reach, Use of Gait belt, Nurse Notified, Other Staff Present  , Family Present, and Left in bed  Time In: 1444  Time Out: 1510  Timed Coded Minutes: 26  Total Treatment Time: 26      Exercises:  See Flowsheets    Plan  Cont Per Plan Of Care    Goals  Short Term Goals  Time Frame for Short Term Goals: 5 days - expires 24  Short Term Goal 1: Transfer supine to sit with min/mod assist x 1-MET  Short Term Goal 2: Transfer bed to chair using wh walker and min/mod assist x 1-MET  Short Term Goal 3: Sit at bedside with good balance to complete self-care tasks-MET  Short Term Goal 4: Recall of

## 2024-09-30 NOTE — PROGRESS NOTES
Phone: [phone] [facility]  Date: 2024  Fax: [phone]      Physical Therapy    Daily Note    Patient Name: Yayo Simeon      : 1940  (84 y.o.)  MRN: [id number]     Pt is PROGRESSING toward goals and increased independence of mobility this treatment session  Discharge Recommendations: Subacute/Skilled Nursing Facility     Assessment                 Sit to Stand: Minimal Assistance, Contact guard assistance  Stand to Sit: Minimal Assistance, Contact guard assistance               WB Status: bathroom to chair; chair to bathroom to curtain and back to chair with wheeled walker and CGA/SBA                Assessment: Patient initally in bathroom on commode upon arrival.  Sit to stand from BSC is min assist.  Dependent for jim care and is then able to ambulate to bedside chair.  He sits up in chair to visit with his company.  Returned later after company left and patient is still up in chair.  He is able to complete seated B LE exercises as outlined above. Sit to stand from chair is min assist.  Requests need for bathroom and is able to ambulate into bathroom to sit on BSC with wheeled walker and CGA/SBA.  When finished on commode, sit to stand is min assist.  Ambulates to curtain with wheeled walker and CGA/SBA and returns to chair for lunch.  Call light in reach and nurse and family members are now present in room with patient.  Safety Devices  Type of Devices: Call light within reach, Gait belt, Left in chair, Nurse notified          Call light in reach, Phone in reach, Use of Gait belt, Nurse Notified, Other Staff Present  , Family Present, and Left in chair    Time In: 1035 (1127)  Time Out: 1045 (1152)  Timed Coded Minutes: 35  Total Treatment Time: 35      Exercises:  See Flowsheets    Plan  Cont Per Plan Of Care    Goals  Short Term Goals  Time Frame for Short Term Goals: 5 days - expires 24  Short Term Goal 1: Transfer supine to sit with min/mod assist x 1-MET  Short Term  Goal 2: Transfer bed to chair using wh walker and min/mod assist x 1-MET  Short Term Goal 3: Sit at bedside with good balance to complete self-care tasks-MET  Short Term Goal 4: Recall of hip precautions-MET       Long Term Goals  Time Frame for Long Term Goals : 14 days - expires 10/9/24  Long Term Goal 1: Complete basic transfers in/OOB and chair/commode with supervision to safely return home  Long Term Goal 2: Ambulate with wh walker 50-75 ft with supervision to safely negotiate home environment  Long Term Goal 3: Good standing balance to complete self-car tasks  Long Term Goal 4: Up/down steps with HR and CGA       [facility] License Number: [id number]    Date: 9/30/2024

## 2024-09-30 NOTE — PROGRESS NOTES
Hospitalist Progress Note  9/30/2024 9:25 AM  Subjective:   Admit Date: 9/25/2024  PCP: Joshua Suarez MD    Interval History:   Patient states that he is doing well this morning.  Occasionally he has muscle spasms and responded well to Flexeril.  Pain overall controlled.  Reports no chest pain, no shortness of breath, no nausea or vomiting.  Appetite has been good.  Bowels moving regularly.    Diet: ADULT ORAL NUTRITION SUPPLEMENT; Breakfast; Diabetic Oral Supplement  ADULT DIET; Regular; 4 carb choices (60 gm/meal)  Medications:   Scheduled Meds:   ascorbic acid  500 mg Oral Daily    aspirin  81 mg Oral Once per day on Sunday Tuesday Thursday Saturday    ferrous sulfate  325 mg Oral Daily with breakfast    lisinopril  2.5 mg Oral Daily    vitamin B-12  500 mcg Oral Daily    famotidine  20 mg Oral BID    metFORMIN  1,000 mg Oral Daily with breakfast    And    alogliptin  12.5 mg Oral Daily    enoxaparin  40 mg SubCUTAneous Daily     Continuous Infusions:  PRN Medications: melatonin, docusate sodium, potassium chloride **OR** potassium alternative oral replacement **OR** [DISCONTINUED] potassium chloride, ondansetron **OR** [DISCONTINUED] ondansetron, polyethylene glycol, acetaminophen **OR** acetaminophen, traMADol, glucose, [DISCONTINUED] dextrose bolus **OR** dextrose bolus, glucagon (rDNA), cyclobenzaprine, oxyCODONE-acetaminophen    Objective:   Vitals: BP (!) 111/57   Pulse 78   Temp 98.4 °F (36.9 °C) (Oral)   Resp 16   Ht 1.854 m (6' 1\")   Wt 81.9 kg (180 lb 8.9 oz)   SpO2 96%   BMI 23.82 kg/m²   BMI: Body mass index is 23.82 kg/m².    CBC:   Recent Labs     09/28/24  0502   WBC 7.9   HGB 9.8*        BMP:    Recent Labs     09/28/24  0502      K 4.4      CO2 27   BUN 35*   CREATININE 1.0   GLUCOSE 110*         Physical Exam:  General Appearance: alert and oriented to person, place and time, in no acute distress  Cardiovascular: normal rate, regular rhythm, normal S1 and S2,

## 2024-10-01 PROCEDURE — 97110 THERAPEUTIC EXERCISES: CPT

## 2024-10-01 PROCEDURE — 97530 THERAPEUTIC ACTIVITIES: CPT

## 2024-10-01 PROCEDURE — 6370000000 HC RX 637 (ALT 250 FOR IP): Performed by: INTERNAL MEDICINE

## 2024-10-01 PROCEDURE — 97116 GAIT TRAINING THERAPY: CPT

## 2024-10-01 PROCEDURE — 6360000002 HC RX W HCPCS: Performed by: INTERNAL MEDICINE

## 2024-10-01 PROCEDURE — 94761 N-INVAS EAR/PLS OXIMETRY MLT: CPT

## 2024-10-01 PROCEDURE — 1200000002 HC SEMI PRIVATE SWING BED

## 2024-10-01 PROCEDURE — 97535 SELF CARE MNGMENT TRAINING: CPT

## 2024-10-01 RX ADMIN — LISINOPRIL 2.5 MG: 5 TABLET ORAL at 09:38

## 2024-10-01 RX ADMIN — OXYCODONE HYDROCHLORIDE AND ACETAMINOPHEN 500 MG: 500 TABLET ORAL at 09:38

## 2024-10-01 RX ADMIN — ENOXAPARIN SODIUM 40 MG: 100 INJECTION SUBCUTANEOUS at 09:37

## 2024-10-01 RX ADMIN — Medication 5 MG: at 19:48

## 2024-10-01 RX ADMIN — Medication 500 MCG: at 09:37

## 2024-10-01 RX ADMIN — FERROUS SULFATE TAB 325 MG (65 MG ELEMENTAL FE) 325 MG: 325 (65 FE) TAB at 09:38

## 2024-10-01 RX ADMIN — METFORMIN HYDROCHLORIDE 1000 MG: 500 TABLET, EXTENDED RELEASE ORAL at 09:37

## 2024-10-01 RX ADMIN — FAMOTIDINE 20 MG: 20 TABLET, FILM COATED ORAL at 19:48

## 2024-10-01 RX ADMIN — FAMOTIDINE 20 MG: 20 TABLET, FILM COATED ORAL at 09:37

## 2024-10-01 RX ADMIN — ASPIRIN 81 MG 81 MG: 81 TABLET ORAL at 09:38

## 2024-10-01 RX ADMIN — ALOGLIPTIN 12.5 MG: 12.5 TABLET, FILM COATED ORAL at 09:37

## 2024-10-01 ASSESSMENT — PAIN SCALES - GENERAL
PAINLEVEL_OUTOF10: 0

## 2024-10-01 ASSESSMENT — PAIN - FUNCTIONAL ASSESSMENT: PAIN_FUNCTIONAL_ASSESSMENT: NONE - DENIES PAIN

## 2024-10-01 NOTE — PROGRESS NOTES
[facility]  Phone: [phone]    Occupational Therapy Skilled Daily Note  Date: 10/1/2024  Patient Name: Yayo Simeon        MRN: [id number]    : 1940  (84 y.o.)    Subjective:     Subjective: Patient was sitting in recliner upon OT arrival. Was agreeable to OT interventions.    Pt is PROGRESSING toward goals and independence of Self Care this treatment session    Discharge recommendation: Continue to assess Pending Progress    Objective:     ADLs:  Grooming: Supervision (washing face/hands and applying deodorant in sitting)  UE Bathing: Stand by assistance (via shower in sitting)  LE Bathing: Adaptive equipment, Stand by assistance (via shower in sitting excluding jim area while using long-handled sponge as needed)  UE Dressing: Stand by assistance (doffing/donning hospital gown in sitting)  LE Dressing: Dependent/Total, Stand by assistance (SBA: doffing bilateral slipper socks in sitting while using dressing stick; DEP: donning bilateral slipper socks in sitting)  Toileting: Contact guard assistance (jim care/bathing in standing)    Transfers:  Sit to stand: Contact guard assistance   Stand to sit: Contact guard assistance  Toilet Transfer: Contact guard assistance  Shower Transfer: Minimal assistance    Assessment:     Assessment: Patient was sitting in recliner upon OT arrival. Was agreeable to OT interventions. Instructed patient on hip precautions. Completed ADLs (including shower) and functional transfers as documented above. Demonstrated good use of dressing stick to doff bilateral slipper socks but later informed this therapist that his wife dons/doffs his footwear at baseline. Utilized a long-handled sponge in the shower to wash the out-of-reach areas secondary to hip precautions. Overall good session, will continue to address goals and progress patient as able/tolerated. Patient remains in recliner with call light/personal items within reach upon OT departure. Provided update  to RN.    Exercises:     N/A    Goals:     Short term goals:  Time Frame for Short Term Goals: 8 days (10/2/24)  Short Term Goal 1: Patient will complete a commode transfer while using DME PRN and adhering to hip precautions with MIN A x1 - MET (CGA)  Short Term Goal 2: Patient will complete upper body dressing and/or bathing while using adaptive equipment/techniques PRN and adhering to hip precautions with SUP/SETUP.  Short Term Goal 3: Patient will complete lower body dressing and/or bathing while using adaptive equipment/techniques PRN and adhering to hip precautions with MIN A - MET (BATHING = SBA)  Short Term Goal 4: To increase activity tolerance for ADLs, patient will engage in 10 minutes of BUE ther ex/ther act while adhering to hip precautions without rest breaks.  Short Term Goal 5: To increase independence and safety with IADLs, patient will tolerate static/dynamic standing x5 minutes while adhering to hip precautions without LOB.    Long term goals:  STGs=LTGs         Call light in reach, Phone in reach, Use of Gait belt, Nurse Notified, and Left in chair  Time In: 0838  Time Out: 0931  Timed Coded Minutes: 53  Total Treatment Time: 53    FILIBERTO Munoz/ALICIA      Date: 10/1/2024

## 2024-10-01 NOTE — PLAN OF CARE
Problem: Discharge Planning  Goal: Discharge to home or other facility with appropriate resources  10/1/2024 0950 by Hope Brunner RN  Outcome: Progressing  10/1/2024 0009 by Cisco Licona RN  Outcome: Progressing     Problem: Safety - Adult  Goal: Free from fall injury  10/1/2024 0950 by Hope Brunner RN  Outcome: Progressing  10/1/2024 0009 by Cisco Licona RN  Outcome: Progressing     Problem: ABCDS Injury Assessment  Goal: Absence of physical injury  10/1/2024 0950 by Hope Brunner RN  Outcome: Progressing  10/1/2024 0009 by Cisco Licona RN  Outcome: Progressing     Problem: Skin/Tissue Integrity  Goal: Absence of new skin breakdown  Description: 1.  Monitor for areas of redness and/or skin breakdown  2.  Assess vascular access sites hourly  3.  Every 4-6 hours minimum:  Change oxygen saturation probe site  4.  Every 4-6 hours:  If on nasal continuous positive airway pressure, respiratory therapy assess nares and determine need for appliance change or resting period.  10/1/2024 0950 by Hpoe Brunner RN  Outcome: Progressing  10/1/2024 0009 by Cisco Licona RN  Outcome: Progressing     Problem: Neurosensory - Adult  Goal: Achieves stable or improved neurological status  10/1/2024 0950 by Hope Brunner RN  Outcome: Progressing  10/1/2024 0009 by Cisco Licona RN  Outcome: Progressing     Problem: Respiratory - Adult  Goal: Achieves optimal ventilation and oxygenation  10/1/2024 0950 by Hope Brunner RN  Outcome: Progressing  10/1/2024 0009 by Cisco Licona RN  Outcome: Progressing     Problem: Cardiovascular - Adult  Goal: Maintains optimal cardiac output and hemodynamic stability  10/1/2024 0950 by Hope Brunner RN  Outcome: Progressing  10/1/2024 0009 by Cisco Licona RN  Outcome: Progressing     Problem: Skin/Tissue Integrity - Adult  Goal: Incisions, wounds, or drain sites healing without S/S of infection  10/1/2024 0950 by Hope Brunner RN  Outcome: Progressing  10/1/2024 0009 by  MIGUEL Peck  Outcome: Progressing     Problem: Chronic Conditions and Co-morbidities  Goal: Patient's chronic conditions and co-morbidity symptoms are monitored and maintained or improved  10/1/2024 0950 by Hope Brunner RN  Outcome: Progressing  10/1/2024 0009 by Cisco Licona RN  Outcome: Progressing     Problem: Nutrition Deficit:  Goal: Optimize nutritional status  10/1/2024 0950 by Hope Brunner RN  Outcome: Progressing  10/1/2024 0009 by Cisco Licona RN  Outcome: Progressing

## 2024-10-01 NOTE — PROGRESS NOTES
Pt up to recliner, c/o bottom hurting; new optifoam applied to coccyx, pt repositioned in recliner with gel cushion and pillow support. Pt requests to have a brief applied and reports at this time he feels comfortable and is without pain. Call light and bedside table within reach.

## 2024-10-01 NOTE — PROGRESS NOTES
Phone: [phone] [facility]  Date: 10/1/2024  Fax: [phone]      Physical Therapy    Daily Note    Patient Name: Yayo Simeon      : 1940  (84 y.o.)  MRN: [id number]     Pt is PROGRESSING toward goals and increased independence of mobility this treatment session  Discharge Recommendations: Subacute/Skilled Nursing Facility     Assessment              Sit to Stand: Contact guard assistance, Stand by assistance  Stand to Sit: Contact guard assistance, Stand by assistance  Bed to Chair: Minimal assistance (x2)             WB Status: 125 ftx1 from patient room to therapy room with wheeled walker and w/c follow with CGA/SBA                Assessment: Patient seated up in chair and agrees to go to therapy room with PTA. Sit to stand from kyra is SBA/CGA.  Ambulates with wheeled walker full distance to therapy room with CGA/SBA and w/c follow.  No LOB noted with gait.  Demonstrates short steps but gait is normal.  Once in therapy room, he is able to complete seaed B LE exercises as outlined above.  Discussed steps and home set up of steps.  Attempted to add standing exercises but patient requests need for bathroom and is wheeled back to room. Sit to stand from w/c is SBA/CGA.  Ambulates ~10 ft into bathroom with wheeled walker and SBA.  On BSC in bathroom with call light in reach and nurse notified.  Safety Devices  Type of Devices: Call light within reach, Gait belt, Nurse notified (in bathroom on BSC)          Call light in reach, Use of Gait belt, and Nurse Notified on BSC in bathroom  Time In: 1001  Time Out: 1038  Timed Coded Minutes: 37  Total Treatment Time: 37      Exercises:  See Flowsheets    Plan  Cont Per Plan Of Care    Goals  Short Term Goals  Time Frame for Short Term Goals: 5 days - expires 24  Short Term Goal 1: Transfer supine to sit with min/mod assist x 1-MET  Short Term Goal 2: Transfer bed to chair using wh walker and min/mod assist x 1-MET  Short Term Goal 3: Sit at

## 2024-10-01 NOTE — PROGRESS NOTES
Phone: [phone] [facility]  Date: 10/1/2024  Fax: [phone]      Physical Therapy    Daily Note    Patient Name: Yayo Simeon      : 1940  (84 y.o.)  MRN: [id number]     Pt is PROGRESSING toward goals and increased independence of mobility this treatment session  Discharge Recommendations: Subacute/Skilled Nursing Facility     Assessment  Sit to Stand: Contact guard assistance, Stand by assistance  Stand to Sit: Contact guard assistance, Stand by assistance    WB Status: 75 ftx1 from patient room to therapy room with wheeled walker  CGA/SBA    Assessment: Patient seated up in chair and agrees to participate in therapy. Sit to stand from chair SBA/CGA.  Ambulates with wheeled walker x75ft CGA/SBA. Declines amb to therapy room this session. No LOB noted with gait. Pt transfer onto/off of bedside commode with CGA. Pt gt x5ft to recliner. BLE seated ex's performed per flowsheet. Pt left seated in recliner with call light in reach.    Call light in reach, Use of Gait belt, and Left in chair  Time In: 1400  Time Out: 1430  Timed Coded Minutes: 30   Total Treatment Time: 30    Exercises:  See Flowsheets    Plan  Cont Per Plan Of Care    Goals  Short Term Goals  Time Frame for Short Term Goals: 5 days - expires 24  Short Term Goal 1: Transfer supine to sit with min/mod assist x 1-MET  Short Term Goal 2: Transfer bed to chair using wh walker and min/mod assist x 1-MET  Short Term Goal 3: Sit at bedside with good balance to complete self-care tasks-MET  Short Term Goal 4: Recall of hip precautions-MET       Long Term Goals  Time Frame for Long Term Goals : 14 days - expires 10/9/24  Long Term Goal 1: Complete basic transfers in/OOB and chair/commode with supervision to safely return home  Long Term Goal 2: Ambulate with wh walker 50-75 ft with supervision to safely negotiate home environment  Long Term Goal 3: Good standing balance to complete self-car tasks  Long Term Goal 4: Up/down steps

## 2024-10-02 ENCOUNTER — APPOINTMENT (OUTPATIENT)
Dept: GENERAL RADIOLOGY | Age: 84
End: 2024-10-02
Attending: INTERNAL MEDICINE
Payer: MEDICARE

## 2024-10-02 PROCEDURE — 6360000002 HC RX W HCPCS: Performed by: INTERNAL MEDICINE

## 2024-10-02 PROCEDURE — 94761 N-INVAS EAR/PLS OXIMETRY MLT: CPT

## 2024-10-02 PROCEDURE — 1200000002 HC SEMI PRIVATE SWING BED

## 2024-10-02 PROCEDURE — 73502 X-RAY EXAM HIP UNI 2-3 VIEWS: CPT

## 2024-10-02 PROCEDURE — 97110 THERAPEUTIC EXERCISES: CPT

## 2024-10-02 PROCEDURE — 97116 GAIT TRAINING THERAPY: CPT

## 2024-10-02 PROCEDURE — 6370000000 HC RX 637 (ALT 250 FOR IP): Performed by: INTERNAL MEDICINE

## 2024-10-02 RX ADMIN — ALOGLIPTIN 12.5 MG: 12.5 TABLET, FILM COATED ORAL at 08:53

## 2024-10-02 RX ADMIN — CYCLOBENZAPRINE 5 MG: 10 TABLET, FILM COATED ORAL at 18:24

## 2024-10-02 RX ADMIN — METFORMIN HYDROCHLORIDE 1000 MG: 500 TABLET, EXTENDED RELEASE ORAL at 08:53

## 2024-10-02 RX ADMIN — LISINOPRIL 2.5 MG: 5 TABLET ORAL at 08:53

## 2024-10-02 RX ADMIN — OXYCODONE HYDROCHLORIDE AND ACETAMINOPHEN 500 MG: 500 TABLET ORAL at 08:53

## 2024-10-02 RX ADMIN — FAMOTIDINE 20 MG: 20 TABLET, FILM COATED ORAL at 21:22

## 2024-10-02 RX ADMIN — ENOXAPARIN SODIUM 40 MG: 100 INJECTION SUBCUTANEOUS at 08:54

## 2024-10-02 RX ADMIN — FAMOTIDINE 20 MG: 20 TABLET, FILM COATED ORAL at 08:54

## 2024-10-02 RX ADMIN — TRAMADOL HYDROCHLORIDE 50 MG: 50 TABLET ORAL at 21:22

## 2024-10-02 RX ADMIN — Medication 500 MCG: at 08:53

## 2024-10-02 RX ADMIN — FERROUS SULFATE TAB 325 MG (65 MG ELEMENTAL FE) 325 MG: 325 (65 FE) TAB at 08:54

## 2024-10-02 ASSESSMENT — PAIN SCALES - GENERAL
PAINLEVEL_OUTOF10: 5
PAINLEVEL_OUTOF10: 0
PAINLEVEL_OUTOF10: 0

## 2024-10-02 ASSESSMENT — PAIN DESCRIPTION - LOCATION: LOCATION: GROIN;HIP

## 2024-10-02 ASSESSMENT — PAIN DESCRIPTION - DESCRIPTORS: DESCRIPTORS: STABBING;SHOOTING

## 2024-10-02 ASSESSMENT — PAIN DESCRIPTION - FREQUENCY: FREQUENCY: INTERMITTENT

## 2024-10-02 ASSESSMENT — PAIN DESCRIPTION - PAIN TYPE: TYPE: SURGICAL PAIN

## 2024-10-02 ASSESSMENT — PAIN - FUNCTIONAL ASSESSMENT: PAIN_FUNCTIONAL_ASSESSMENT: ACTIVITIES ARE NOT PREVENTED

## 2024-10-02 ASSESSMENT — PAIN DESCRIPTION - ONSET: ONSET: SUDDEN

## 2024-10-02 ASSESSMENT — PAIN DESCRIPTION - ORIENTATION: ORIENTATION: RIGHT

## 2024-10-02 NOTE — PLAN OF CARE
Problem: Discharge Planning  Goal: Discharge to home or other facility with appropriate resources  Outcome: Progressing  Flowsheets (Taken 10/1/2024 2000)  Discharge to home or other facility with appropriate resources: Identify barriers to discharge with patient and caregiver     Problem: Safety - Adult  Goal: Free from fall injury  Outcome: Progressing     Problem: ABCDS Injury Assessment  Goal: Absence of physical injury  Outcome: Progressing     Problem: Skin/Tissue Integrity  Goal: Absence of new skin breakdown  Description: 1.  Monitor for areas of redness and/or skin breakdown  2.  Assess vascular access sites hourly  3.  Every 4-6 hours minimum:  Change oxygen saturation probe site  4.  Every 4-6 hours:  If on nasal continuous positive airway pressure, respiratory therapy assess nares and determine need for appliance change or resting period.  Outcome: Progressing     Problem: Neurosensory - Adult  Goal: Achieves stable or improved neurological status  Outcome: Progressing  Flowsheets (Taken 10/1/2024 2000)  Achieves stable or improved neurological status: Assess for and report changes in neurological status     Problem: Respiratory - Adult  Goal: Achieves optimal ventilation and oxygenation  Outcome: Progressing     Problem: Cardiovascular - Adult  Goal: Maintains optimal cardiac output and hemodynamic stability  Outcome: Progressing  Flowsheets (Taken 10/1/2024 2000)  Maintains optimal cardiac output and hemodynamic stability:   Monitor blood pressure and heart rate   Monitor urine output and notify Licensed Independent Practitioner for values outside of normal range     Problem: Skin/Tissue Integrity - Adult  Goal: Incisions, wounds, or drain sites healing without S/S of infection  Outcome: Progressing  Flowsheets (Taken 10/1/2024 2000)  Incisions, Wounds, or Drain Sites Healing Without Sign and Symptoms of Infection: TWICE DAILY: Assess and document skin integrity     Problem: Musculoskeletal -  Adult  Goal: Return mobility to safest level of function  Outcome: Progressing  Flowsheets (Taken 10/1/2024 2000)  Return Mobility to Safest Level of Function:   Assess patient stability and activity tolerance for standing, transferring and ambulating with or without assistive devices   Assist with transfers and ambulation using safe patient handling equipment as needed  Goal: Maintain proper alignment of affected body part  Outcome: Progressing  Flowsheets (Taken 10/1/2024 2000)  Maintain proper alignment of affected body part: Support and protect limb and body alignment per provider's orders  Goal: Return ADL status to a safe level of function  Outcome: Progressing  Flowsheets (Taken 10/1/2024 2000)  Return ADL Status to a Safe Level of Function: Administer medication as ordered     Problem: Gastrointestinal - Adult  Goal: Minimal or absence of nausea and vomiting  Outcome: Progressing     Problem: Genitourinary - Adult  Goal: Urinary catheter remains patent  Outcome: Progressing  Flowsheets (Taken 10/1/2024 2000)  Urinary catheter remains patent: Assess patency of urinary catheter     Problem: Infection - Adult  Goal: Absence of infection at discharge  Outcome: Progressing  Flowsheets (Taken 10/1/2024 2000)  Absence of infection at discharge: Assess and monitor for signs and symptoms of infection     Problem: Metabolic/Fluid and Electrolytes - Adult  Goal: Electrolytes maintained within normal limits  Outcome: Progressing  Goal: Glucose maintained within prescribed range  Outcome: Progressing     Problem: Hematologic - Adult  Goal: Maintains hematologic stability  Outcome: Progressing  Flowsheets (Taken 10/1/2024 2000)  Maintains hematologic stability: Assess for signs and symptoms of bleeding or hemorrhage     Problem: Pain  Goal: Verbalizes/displays adequate comfort level or baseline comfort level  Outcome: Progressing     Problem: Chronic Conditions and Co-morbidities  Goal: Patient's chronic conditions and

## 2024-10-02 NOTE — PROGRESS NOTES
Comprehensive Nutrition Assessment    Type and Reason for Visit:  Reassess    Nutrition Recommendations/Plan:   Continue current diet.   Continue current ONS.      Malnutrition Assessment:  Malnutrition Status:  At risk for malnutrition (Comment) (09/25/24 1600)    Context:  Acute Illness     Findings of the 6 clinical characteristics of malnutrition:  Energy Intake:  Mild decrease in energy intake (Comment) (PO < 75% since acute admission)  Weight Loss:  No significant weight loss     Body Fat Loss:  Mild body fat loss Fat Overlying Ribs   Muscle Mass Loss:  No significant muscle mass loss    Fluid Accumulation:  Mild Extremities   Strength:  Not Performed    Nutrition Assessment:    Continued increased nutrient needs aeb healing needs from L hip Fx, post surgical intervention.  4# weight loss since yesterday. Drank 680 ml. d/c Thursday expected. Pt encouraged to eat protein foods for healing.    Nutrition Related Findings:    trace LLE edeam. + watery BM 9/30. Wound Type: Surgical Incision       Current Nutrition Intake & Therapies:    Average Meal Intake: %  Average Supplements Intake: %  ADULT ORAL NUTRITION SUPPLEMENT; Breakfast; Diabetic Oral Supplement  ADULT DIET; Regular; 4 carb choices (60 gm/meal)    Anthropometric Measures:  Height: 185.4 cm (6' 1\")  Ideal Body Weight (IBW): 184 lbs (84 kg)    Admission Body Weight: 81.4 kg (179 lb 7 oz) (acute admission)  Current Body Weight: 80.1 kg (176 lb 9.4 oz), 97.3 % IBW. Weight Source: Bed Scale  Current BMI (kg/m2): 23.3  Usual Body Weight: 84.8 kg (187 lb)  % Weight Change (Calculated): -4.3  Weight Adjustment For: No Adjustment                 BMI Categories: Normal Weight (BMI 22.0 to 24.9) age over 65    Estimated Daily Nutrient Needs:  Energy Requirements Based On: Kcal/kg  Weight Used for Energy Requirements: Current  Energy (kcal/day): 8864-3993 (25-28/kg)  Weight Used for Protein Requirements: Current  Protein (g/day): 106-122g  (1.3-1.5g/kg)  Method Used for Fluid Requirements: 1 ml/kcal  Fluid (ml/day): 2,200 ml  Recent Labs     09/30/24  0923   POCGLU 102*      Nutrition Diagnosis:   Increased nutrient needs related to acute injury/trauma as evidenced by other (comment) (healing from hip surgery)    Nutrition Interventions:   Food and/or Nutrient Delivery: Continue Current Diet, Continue Oral Nutrition Supplement  Nutrition Education/Counseling: No recommendation at this time  Coordination of Nutrition Care: Continue to monitor while inpatient  Plan of Care discussed with: nurse, dietary     Goals:  Previous Goal Met: Progressing toward Goal(s)  Goals: Meet at least 75% of estimated needs       Nutrition Monitoring and Evaluation:      Food/Nutrient Intake Outcomes: Food and Nutrient Intake, Supplement Intake  Physical Signs/Symptoms Outcomes: Biochemical Data, Weight, Skin, Fluid Status or Edema    Discharge Planning:    Continue current diet, Continue Oral Nutrition Supplement     BHARAT DENNIS RD, LD  Contact: [phone]

## 2024-10-02 NOTE — DISCHARGE INSTR - COC
Continuity of Care Form    Patient Name: Yayo Simeon   :  1940  MRN:  [id number]    Admit date:  2024  Discharge date:  10/03/2024      Code Status Order: Full Code   Advance Directives:   Advance Care Flowsheet Documentation             Admitting Physician:  Andi Mena MD  PCP: Joshua Suarez MD    Discharging Nurse: KANIKA Dalton RN  Discharging Hospital Unit/Room#: 0256/0256-01  Discharging Unit Phone Number: [phone]    Emergency Contact:   Extended Emergency Contact Information  Primary Emergency Contact: Nely Simeon  Home Phone: [phone]  Relation: Spouse   needed? No  Secondary Emergency Contact: Luis Bridges  Home Phone: [phone]  Relation: Niece/Nephew  Preferred language: English    Past Surgical History:  Past Surgical History:   Procedure Laterality Date    HAND TENDON SURGERY Right     Right hand surgery    HIP ARTHROPLASTY Left     [place], Dr. Whipple, 2024    HIP SURGERY Left 2024    LEFT HIP HEMIARTHROPLASTY performed by Stevie Whipple MD at [facility] OR       Immunization History:   Immunization History   Administered Date(s) Administered    COVID-19, PFIZER PURPLE top, DILUTE for use, (age 12 y+), 30mcg/0.3mL 2021, 2021, 2021       Active Problems:  Patient Active Problem List   Diagnosis Code    Left hip pain M25.552    Hip fracture requiring operative repair, left, closed, initial encounter ([facility]) S72.002A    Generalized weakness R53.1       Isolation/Infection:   Isolation            No Isolation          Patient Infection Status       None to display            Nurse Assessment:  Last Vital Signs: /72   Pulse 77   Temp 97.6 °F (36.4 °C) (Oral)   Resp 16   Ht 1.854 m (6' 1\")   Wt 80.1 kg (176 lb 9.4 oz)   SpO2 98%   BMI 23.30 kg/m²     Last documented pain score (0-10 scale): Pain Level: 0  Last Weight:   Wt Readings from Last 1 Encounters:   10/02/24 80.1 kg (176 lb 9.4 oz)     Mental Status:  oriented and  carbs/meal (1800kcals/day)    Routes of Feeding: Oral  Liquids: No Restrictions  Daily Fluid Restriction: no  Last Modified Barium Swallow with Video (Video Swallowing Test): not done    Treatments at the Time of Hospital Discharge:   Respiratory Treatments: N/A  Oxygen Therapy:  is not on home oxygen therapy.  Ventilator:    - No ventilator support    Rehab Therapies: Physical Therapy and Occupational Therapy  Weight Bearing Status/Restrictions: No weight bearing restrictions  Other Medical Equipment (for information only, NOT a DME order):  walker  Other Treatments: Dressing: Keep dressing to left hip clean and dry. Change if dressing gets wet or has drainage.    Patient's personal belongings (please select all that are sent with patient):  Dentures upper, Shoes and socks, shirt    RN SIGNATURE:  Electronically signed by Driss Dalton RN on 10/3/24 at 9:16 AM EDT    CASE MANAGEMENT/SOCIAL WORK SECTION    Inpatient Status Date: [facility] 9/25/2024    Readmission Risk Assessment Score:  Readmission Risk              Risk of Unplanned Readmission:  9           Discharging to Facility/ Agency   [facility]   Address: [place] [address]  Phone:[phone]  Fax: [phone]    Dialysis Facility (if applicable)   Name:  Address:  Dialysis Schedule:  Phone:  Fax:    / signature: Electronically signed by PRECIOUS Alexander, LULUW on 10/2/24 at 1:50 PM EDT    PHYSICIAN SECTION    Prognosis: Good    Condition at Discharge: Stable    Rehab Potential (if transferring to Rehab): Good    Recommended Labs or Other Treatments After Discharge:     Physician Certification: I certify the above information and transfer of Yayo Simeon  is necessary for the continuing treatment of the diagnosis listed and that he requires Home Care for less 30 days.     Update Admission H&P: No change in H&P    PHYSICIAN SIGNATURE:  Electronically signed by Delfin Ji MD on

## 2024-10-02 NOTE — PROGRESS NOTES
Scheduled 10/8/20 @10 AM Swing bed IDT meeting held this date regarding pt progress in swing bed. Therapy reports that pt has been making progress with them. Therapy anticipates discharge of skilled services on Friday 10/4/2024 and discharge to home on Saturday 10/5/2024.     Swing bed coordinator, dietician, RN case manager, and SW met with pt to discuss above. Pt in agreement with plan of discharge but would like to leave on Thursday 10/3/2024 instead. Pt understands that he may have until Saturday but does not wish to stay that long. SW provided medicare notice of non coverage with discharge of services on 10/4/2024 and pt signs this letter. Copy made and provided to paper chart and to pt. Pt agreeable to  services and freedom of choice list provided. Pt states that he would like to use [facility]. SW made referral to [facility] and they will open pt services on Friday. Pt reports that he has a walker at home and needs no other DME. Pt excited to go home tomorrow. SW will continue to follow for any additional needs. Tania LANG LSW 10/2/2024

## 2024-10-02 NOTE — PROGRESS NOTES
Phone: [phone] [facility]  Date: 10/2/2024  Fax: [phone]      Physical Therapy    Daily Note    Patient Name: Yayo Simeon      : 1940  (84 y.o.)  MRN: [id number]     Pt is PROGRESSING toward goals and increased independence of mobility this treatment session  Discharge Recommendations: Subacute/Skilled Nursing Facility     Assessment          Supine to Sit: Moderate assistance  Sit to Supine: Supervision       Sit to Stand: Stand by assistance, Supervision  Stand to Sit: Stand by assistance, Supervision               WB Status: 100 ft x1            Assessment: Patient in chair upon arrival to room. He agrees to work with PTA but wishes to stay in his room. He is able to complete seated B LE exercises without difficulty.  Sit to stand from chair is SBA.  Ambulates with wheeled walker and SBA ~100 ft x1.  Returns to room and prior to sitting in chair, he requests need for bathroom. Ambulates into bathroom to sit on BSC.  When finshed, he returns to chair to sit up with call light in reach.  Plans to discharge home tomorrow.  Safety Devices  Type of Devices: Call light within reach, Gait belt, Left in chair, Nurse notified          Call light in reach, Phone in reach, Use of Gait belt, Nurse Notified, and Left in chair  Time In: 1400  Time Out: 1430  Timed Coded Minutes: 30  Total Treatment Time: 30      Exercises:  See Flowsheets    Plan  Cont Per Plan Of Care    Goals  Short Term Goals  Time Frame for Short Term Goals: 5 days - expires 24  Short Term Goal 1: Transfer supine to sit with min/mod assist x 1-MET  Short Term Goal 2: Transfer bed to chair using wh walker and min/mod assist x 1-MET  Short Term Goal 3: Sit at bedside with good balance to complete self-care tasks-MET  Short Term Goal 4: Recall of hip precautions-MET       Long Term Goals  Time Frame for Long Term Goals : 14 days - expires 10/9/24  Long Term Goal 1: Complete basic transfers in/OOB and chair/commode with

## 2024-10-02 NOTE — DISCHARGE INSTRUCTIONS
Jason Herrera [facility] will call you on Thursday with appointment time for Friday 10/4/2024 to start services at home. Their phone number in case you need to call them is [phone].

## 2024-10-02 NOTE — PROGRESS NOTES
Phone: [phone] [facility]  Date: 10/2/2024  Fax: [phone]      Physical Therapy    Daily Note    Patient Name: Yayo Simeon      : 1940  (84 y.o.)  MRN: [id number]     Pt is PROGRESSING toward goals and increased independence of mobility this treatment session  Discharge Recommendations: Subacute/Skilled Nursing Facility     Assessment           Sit to Stand: Stand by assistance, Supervision  Stand to Sit: Stand by assistance, Supervision  Bed to Chair: Minimal assistance (x2)             WB Status: 110 ftx2 (patient room to therapy room back to patient room)           Stairs  # Steps : 5 (x 2 laps)  Stairs Height: 4\" (and 6\")  Rails: Bilateral  Assistance: Minimal assistance, Contact guard assistance (x2)    Assessment: Patient in chair upon arrival to room. Sit to stand from chair is SBA/CGA.  Ambulates with wheeled walker to therapy room with SBA/CGA and w/c follow.  No LOB noted with gait.  Completes seated exercises as outlined above. Up/down steps 4\" and 6\" in height with B handrails and min/CGA x 2. Ambulates back to room following and requests to go to bathroom.  Demonstrates good standing balance to pull brief down and sit on BSC.  Remains on BSC in bathroom with call light in reach.  Safety Devices  Type of Devices: Call light within reach, Gait belt (in bathroom on BSC)          Call light in reach and Use of Gait belt in bathroom on BSC  Time In: 1049  Time Out: 1129  Timed Coded Minutes: 40  Total Treatment Time: 40      Exercises:  See Flowsheets    Plan  Cont Per Plan Of Care    Goals  Short Term Goals  Time Frame for Short Term Goals: 5 days - expires 24  Short Term Goal 1: Transfer supine to sit with min/mod assist x 1-MET  Short Term Goal 2: Transfer bed to chair using wh walker and min/mod assist x 1-MET  Short Term Goal 3: Sit at bedside with good balance to complete self-care tasks-MET  Short Term Goal 4: Recall of hip precautions-MET       Long Term Goals  Time

## 2024-10-02 NOTE — PROGRESS NOTES
[facility]  Swing Bed Interdisciplinary Care Plan Conference Report   Recertification for Continued Skilled Care.    Patients name:Yayo Simeon  Date of Conference: 10/2/2024    The Following Information was discussed and agreed upon with the patient and/or Caregivers as listed below.    Names of Team Members and Caregivers present for meeting:  : TRAVIS Alvarado  Nursing: LORE Rodriguez  Therapy: LORE Minor, PT; ALICIA Lewis/OTR  Dietician: FRANCA Ingram RDN  Activities: Val Ferguson  Pastoral Care:   Caregivers:    [] Spouse  [] Child/Children [] Significant Other   [] Other:     Nutrition:  Current Body Weight:   Wt Readings from Last 1 Encounters:   10/02/24 80.1 kg (176 lb 9.4 oz)     Weight Change: 4# weight loss since admission No intake/output data recorded.  Current Diet order:ADULT ORAL NUTRITION SUPPLEMENT; Breakfast; Diabetic Oral Supplement  ADULT DIET; Regular; 4 carb choices (60 gm/meal)  Intakes: % of meals and supplements  Diet Education Provided: encouraged protein foods   Goal: PO >75% meals and supplements    Spiritual:  Yazdanism needs met: [x] Yes  [] No  [] N/A  Notified  or  of admission: [x] Yes  [] No  [] N/A  Patient added to Communion List: [] Yes  [] No  [x] N/A  Any other concerns or comments:   Goal: Participate 2-3 times per week    Occupational Therapy:  Current ADL Status: ADL  Feeding: None  Grooming: Supervision (washing face/hands and applying deodorant in sitting)  UE Bathing: Stand by assistance (via shower in sitting)  LE Bathing: Adaptive equipment, Stand by assistance (via shower in sitting excluding jim area while using long-handled sponge as needed)  UE Dressing: Stand by assistance (doffing/donning hospital gown in sitting)  LE Dressing: Dependent/Total, Stand by assistance (SBA: doffing bilateral slipper socks in sitting while using dressing stick; DEP: donning bilateral slipper socks in sitting)  Toileting: Contact guard

## 2024-10-03 VITALS
WEIGHT: 177.8 LBS | SYSTOLIC BLOOD PRESSURE: 112 MMHG | TEMPERATURE: 98.2 F | HEART RATE: 97 BPM | OXYGEN SATURATION: 100 % | RESPIRATION RATE: 16 BRPM | HEIGHT: 73 IN | DIASTOLIC BLOOD PRESSURE: 65 MMHG | BODY MASS INDEX: 23.56 KG/M2

## 2024-10-03 PROCEDURE — 94761 N-INVAS EAR/PLS OXIMETRY MLT: CPT

## 2024-10-03 PROCEDURE — 6360000002 HC RX W HCPCS: Performed by: INTERNAL MEDICINE

## 2024-10-03 PROCEDURE — 6370000000 HC RX 637 (ALT 250 FOR IP): Performed by: INTERNAL MEDICINE

## 2024-10-03 RX ORDER — PSEUDOEPHEDRINE HCL 30 MG
100 TABLET ORAL 2 TIMES DAILY PRN
Qty: 60 CAPSULE | Refills: 0 | Status: SHIPPED | OUTPATIENT
Start: 2024-10-03

## 2024-10-03 RX ORDER — TRAMADOL HYDROCHLORIDE 50 MG/1
50 TABLET ORAL EVERY 6 HOURS PRN
Qty: 30 TABLET | Refills: 0 | Status: SHIPPED | OUTPATIENT
Start: 2024-10-03 | End: 2024-10-10

## 2024-10-03 RX ORDER — CYCLOBENZAPRINE HCL 5 MG
5 TABLET ORAL 3 TIMES DAILY PRN
Qty: 60 TABLET | Refills: 0 | Status: SHIPPED | OUTPATIENT
Start: 2024-10-03 | End: 2024-10-23

## 2024-10-03 RX ADMIN — ALOGLIPTIN 12.5 MG: 12.5 TABLET, FILM COATED ORAL at 08:24

## 2024-10-03 RX ADMIN — ENOXAPARIN SODIUM 40 MG: 100 INJECTION SUBCUTANEOUS at 08:24

## 2024-10-03 RX ADMIN — Medication 500 MCG: at 08:24

## 2024-10-03 RX ADMIN — OXYCODONE HYDROCHLORIDE AND ACETAMINOPHEN 500 MG: 500 TABLET ORAL at 08:24

## 2024-10-03 RX ADMIN — TRAMADOL HYDROCHLORIDE 50 MG: 50 TABLET ORAL at 09:26

## 2024-10-03 RX ADMIN — ASPIRIN 81 MG 81 MG: 81 TABLET ORAL at 08:24

## 2024-10-03 RX ADMIN — METFORMIN HYDROCHLORIDE 1000 MG: 500 TABLET, EXTENDED RELEASE ORAL at 08:30

## 2024-10-03 RX ADMIN — FAMOTIDINE 20 MG: 20 TABLET, FILM COATED ORAL at 08:25

## 2024-10-03 RX ADMIN — FERROUS SULFATE TAB 325 MG (65 MG ELEMENTAL FE) 325 MG: 325 (65 FE) TAB at 08:24

## 2024-10-03 ASSESSMENT — PAIN DESCRIPTION - LOCATION: LOCATION: HIP;BUTTOCKS

## 2024-10-03 ASSESSMENT — PAIN DESCRIPTION - ORIENTATION: ORIENTATION: LEFT

## 2024-10-03 ASSESSMENT — PAIN - FUNCTIONAL ASSESSMENT: PAIN_FUNCTIONAL_ASSESSMENT: ACTIVITIES ARE NOT PREVENTED

## 2024-10-03 ASSESSMENT — PAIN SCALES - GENERAL
PAINLEVEL_OUTOF10: 0
PAINLEVEL_OUTOF10: 4
PAINLEVEL_OUTOF10: 0

## 2024-10-03 ASSESSMENT — PAIN DESCRIPTION - DESCRIPTORS: DESCRIPTORS: ACHING;SORE

## 2024-10-03 NOTE — PROGRESS NOTES
Acknowledge pt discharge to home this date with spouse. Discharge summary, AVS and HH order all faxed to [facility]. [facility] plans to open pt services tomorrow. Pt has needed DME. Pt chooses to leave today rather than staying until Saturday. No further needs identified. Tania Sexton, MSW, LSW 10/3/2024

## 2024-10-03 NOTE — DISCHARGE SUMMARY
and GERD, was picking sticks up on his property.  He states he stopped his golf cart on a hill and set the brake.  After getting out the cart started moving forward so  he grabbed it to attempt to stop it.  Unfortunately, he was thrown to the ground with immediate left hip pain.  After yelling or 20 minutes his wife and daughter finally heard him.  He denies hitting his head or having any LOC.  He states he has been feeling well recently but did have a runny nose last week.  Yayo does have a chronic nuno secondary to urinary retention and follows with Dr. Prater.  Yayo denies a history of heart problems and denies having episodes of chest pain.       In the ER his CMP was normal other than a glucose of 150.  CBC was normal other than a Hgb of 12.  HgbA1C was 6.  INR was 1.0 with a PTT of 28.7.  UA showed 2 + LE with 10 to 20 WBC.       CXR showed:  IMPRESSION:     1. Chronic obstructive pulmonary disease  2. Small right pleural effusion or thickening  3. Suspect multisegmental areas of pleural-parenchymal scarring throughout the   right mid chest and left upper chest.  4. Convincing alveolar airspace disease or pneumothorax are not evident.     Hip xray showed:  1. Diffuse osteopenia. Acute subcapital fracture of the left femoral neck is   noted, with approximately 1 cm of superior displacement/overriding and 0.5 cm   of lateral displacement, and mild varus angulation of the distal fracture   fragment. No hip dislocation.  2. 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  3. Multilevel degenerative changes of lumbar spine are incompletely evaluated.  4. No other fracture, malalignment, or acute bony abnormality is seen.     ECG showed:  Sinus rhythm with 2nd degree A-V block ([place] I)  Left axis deviation  Non-specific intra-ventricular conduction delay  Minimal voltage criteria for LVH, may be normal variant ( [place] product )  Abnormal  ECG      Yayo was admitted with IV Dilaudid and oral Percocet for pain control.  He was placed on SCD's for DVT prophylaxis. IV Rocephin was started for a UTI (urine repeated after nuno cath was changed).   Dr. Sampson in Cardiology was consulted an cleared Yayo for surgery.  Dr. Whipple from Ortho was consulted and performed a left hip hemiarthroplasty on 9/23.  He had no intra or post op complications.  SQ Lovenox was added after surgery for DVT prophylaxis.  After surgery PT /OT was ordered daily.  Daily labs were ordered after surgery to monitor for worsening anemia.   and the Skilled Care Coordinator were consulted for DC planning.  After discussion, Yayo decided he would go into swing bed for further therapy before returning home.   Patient made a good progress while on swing bed.  Acute issues resolved.  Pain is well-controlled with tramadol.  Also started on Flexeril for muscle spasms.  Patient with chronic indwelling Nuno catheter and follows up with urologist Dr. Prater in [place].  Nuno catheter was changed during this hospital stay.  Patient will be discharged home with tramadol for pain control, Flexeril as needed for muscle spasms.  His blood pressure borderline low this morning and we stopped his lisinopril.  He is to follow-up with PCP.        Disposition: home with [facility]    Condition: Stable    Time Spent: 36 minutes      Electronically signed by Delfin Ji MD on 10/3/2024 at 8:19 AM  Discharging Hospitalist

## 2024-10-03 NOTE — PROGRESS NOTES
[facility]                       Inpatient Medication Education Note                                 Patient admitted for weakness s/p annette arthroplasty    Medications reviewed with the patient include:    cyclobenzaprine (FLEXERIL) 5 MG tablet    docusate sodium (COLACE, DULCOLAX) 100 MG CAPS    traMADol (ULTRAM) 50 MG tablet    celecoxib (CELEBREX) 200 MG capsule    lisinopril (PRINIVIL;ZESTRIL) 2.5 MG tablet      Patient education provided when necessary to include potential medication related side effects with acknowledgement of understanding. Discussed why each medication was prescribed and when and how to best take. Understands new as needed medications and to stop the Celebrex and lisinopril. No additional questions.    Kalyan Cali, PharmD 10/3/2024 10:06 AM

## 2024-10-11 ENCOUNTER — HOSPITAL ENCOUNTER (OUTPATIENT)
Dept: GENERAL RADIOLOGY | Age: 84
Discharge: HOME | End: 2024-10-13
Payer: MEDICARE

## 2024-10-11 ENCOUNTER — HOSPITAL ENCOUNTER (OUTPATIENT)
Age: 84
Discharge: HOME | End: 2024-10-13
Payer: MEDICARE

## 2024-10-11 DIAGNOSIS — S72.002A LEFT DISPLACED FEMORAL NECK FRACTURE (HCC): ICD-10-CM

## 2024-10-11 PROCEDURE — 73502 X-RAY EXAM HIP UNI 2-3 VIEWS: CPT

## (undated) DEVICE — CEMENT MIXING SYSTEM WITH FEMORAL BREAKWAY NOZZLE: Brand: REVOLUTION

## (undated) DEVICE — PILLOW ABD SM W12XH6XL18IN LEG FOAM NYLEX CVR W/ STRP DISP

## (undated) DEVICE — SUTURE FIBERWIRE SZ 2 W/ TAPERED NEEDLE BLUE L38IN NONABSORB BLU L26.5MM 1/2 CIRCLE AR7200

## (undated) DEVICE — GLOVE SURG SZ 65 CRM LTX FREE POLYISOPRENE POLYMER BEAD ANTI

## (undated) DEVICE — INTENDED FOR TISSUE SEPARATION, AND OTHER PROCEDURES THAT REQUIRE A SHARP SURGICAL BLADE TO PUNCTURE OR CUT.: Brand: BARD-PARKER ® CARBON RIB-BACK BLADES

## (undated) DEVICE — 2108 SERIES SAGITTAL BLADE, GROUND (18.5 X 1.24 X 73.0MM)

## (undated) DEVICE — SOLUTION IRRIG 1000ML 0.9% SOD CHL USP POUR PLAS BTL

## (undated) DEVICE — PAD POS MORPHBOARD 9 IN DISP

## (undated) DEVICE — DRESSING HYDROFIBER AQUACEL AG ADVANTAGE 3.5X10 IN

## (undated) DEVICE — GLOVE SURG SZ 75 CRM LTX FREE POLYISOPRENE POLYMER BEAD ANTI

## (undated) DEVICE — TIP SUCT FLNG TIP ON OFF CTRL YANK

## (undated) DEVICE — SPONGE LAP W18XL18IN WHT COT 4 PLY FLD STRUNG RADPQ DISP ST 2 PER PACK

## (undated) DEVICE — STOCKINETTE COMPRESSION W9XL48IN IMPERVIOUS WITH PULL TAB

## (undated) DEVICE — 1000 S-DRAPE TOWEL DRAPE 10/BX: Brand: STERI-DRAPE™

## (undated) DEVICE — COVER,MAYO STAND,STERILE: Brand: MEDLINE

## (undated) DEVICE — GLOVE SURG SZ 7 CRM LTX FREE POLYISOPRENE POLYMER BEAD ANTI

## (undated) DEVICE — BIT DRL L140MM DIA2MM QUIK CPL 3 FLUT CALIB DEPTH MRK W/O

## (undated) DEVICE — SUTURE SUTTAPE L40IN DIA1.3MM NONABSORBABLE WHT BLU L26.5MM AR7500

## (undated) DEVICE — Device

## (undated) DEVICE — APPLICATOR MEDICATED 26 CC SOLUTION HI LT ORNG CHLORAPREP

## (undated) DEVICE — KIT BNE CEM PREP FEM QUIK-USE 1 PER CA

## (undated) DEVICE — 4-PORT MANIFOLD: Brand: NEPTUNE 2

## (undated) DEVICE — 3M™ IOBAN™ 2 ANTIMICROBIAL INCISE DRAPE 6650EZ: Brand: IOBAN™ 2

## (undated) DEVICE — ELECTRODE PT RET AD L9FT HI MOIST COND ADH HYDRGEL CORDED

## (undated) DEVICE — STAPLER SKIN H3.9MM WIRE DIA0.58MM CRWN 6.9MM 35 STPL ROT

## (undated) DEVICE — FAN SPRAY KIT: Brand: PULSAVAC®

## (undated) DEVICE — Device: Brand: POWER-FLO®

## (undated) DEVICE — SOLUTION IRRIG 3000ML 0.9% SOD CHL USP UROMATIC PLAS CONT

## (undated) DEVICE — HIGH CAPACITY NARROW INTRAMEDULLARY TIP: Brand: PULSAVAC®

## (undated) DEVICE — 3M™ STERI-DRAPE™ U-DRAPE, LONG 1019: Brand: STERI-DRAPE™

## (undated) DEVICE — SUTURE ABSORBABLE BRAIDED 2-0 CT-1 27 IN UD VICRYL J259H

## (undated) DEVICE — SYRINGE MEDICAL 3ML CLEAR PLASTIC STANDARD NON CONTROL LUERLOCK TIP DISPOSABLE

## (undated) DEVICE — HEWSON SUTURE RETRIEVER: Brand: HEWSON SUTURE RETRIEVER

## (undated) DEVICE — PAD PROTCT PT 12 IN

## (undated) DEVICE — SINGLE BASIN PLUS 3-LF: Brand: MEDLINE INDUSTRIES, INC.

## (undated) DEVICE — BANDAGE COBAN 6 IN WND 6INX5YD FOAM